# Patient Record
Sex: FEMALE | Race: WHITE | Employment: UNEMPLOYED | URBAN - METROPOLITAN AREA
[De-identification: names, ages, dates, MRNs, and addresses within clinical notes are randomized per-mention and may not be internally consistent; named-entity substitution may affect disease eponyms.]

---

## 2018-03-09 ENCOUNTER — ANESTHESIA EVENT (OUTPATIENT)
Dept: SURGERY | Age: 48
End: 2018-03-09
Payer: COMMERCIAL

## 2018-03-09 RX ORDER — LIDOCAINE HYDROCHLORIDE 10 MG/ML
0.1 INJECTION, SOLUTION EPIDURAL; INFILTRATION; INTRACAUDAL; PERINEURAL AS NEEDED
Status: CANCELLED | OUTPATIENT
Start: 2018-03-09

## 2018-03-09 RX ORDER — SODIUM CHLORIDE, SODIUM LACTATE, POTASSIUM CHLORIDE, CALCIUM CHLORIDE 600; 310; 30; 20 MG/100ML; MG/100ML; MG/100ML; MG/100ML
75 INJECTION, SOLUTION INTRAVENOUS CONTINUOUS
Status: CANCELLED | OUTPATIENT
Start: 2018-03-09 | End: 2018-03-10

## 2018-03-09 RX ORDER — FAMOTIDINE 20 MG/1
20 TABLET, FILM COATED ORAL ONCE
Status: CANCELLED | OUTPATIENT
Start: 2018-03-09 | End: 2018-03-09

## 2018-03-09 RX ORDER — CIPROFLOXACIN 2 MG/ML
400 INJECTION, SOLUTION INTRAVENOUS ONCE
Status: CANCELLED | OUTPATIENT
Start: 2018-03-09 | End: 2018-03-09

## 2018-03-12 ENCOUNTER — HOSPITAL ENCOUNTER (OUTPATIENT)
Age: 48
Setting detail: OBSERVATION
Discharge: HOME | End: 2018-03-14
Attending: UROLOGY | Admitting: UROLOGY
Payer: COMMERCIAL

## 2018-03-12 ENCOUNTER — APPOINTMENT (OUTPATIENT)
Dept: GENERAL RADIOLOGY | Age: 48
End: 2018-03-12
Attending: UROLOGY
Payer: COMMERCIAL

## 2018-03-12 ENCOUNTER — ANESTHESIA (OUTPATIENT)
Dept: SURGERY | Age: 48
End: 2018-03-12
Payer: COMMERCIAL

## 2018-03-12 ENCOUNTER — HOSPITAL ENCOUNTER (OUTPATIENT)
Dept: CARDIAC CATH/INVASIVE PROCEDURES | Age: 48
Discharge: HOME | End: 2018-03-12
Attending: RADIOLOGY | Admitting: RADIOLOGY
Payer: COMMERCIAL

## 2018-03-12 VITALS
RESPIRATION RATE: 16 BRPM | HEART RATE: 82 BPM | DIASTOLIC BLOOD PRESSURE: 72 MMHG | OXYGEN SATURATION: 98 % | SYSTOLIC BLOOD PRESSURE: 120 MMHG

## 2018-03-12 DIAGNOSIS — N20.0 KIDNEY STONE: ICD-10-CM

## 2018-03-12 LAB
ANION GAP SERPL CALC-SCNC: 7 MMOL/L (ref 3–18)
ANION GAP SERPL CALC-SCNC: 7 MMOL/L (ref 3–18)
APPEARANCE UR: ABNORMAL
APTT PPP: 26.4 SEC (ref 23–36.4)
BACTERIA URNS QL MICRO: ABNORMAL /HPF
BILIRUB UR QL: NEGATIVE
BUN SERPL-MCNC: 13 MG/DL (ref 7–18)
BUN SERPL-MCNC: 14 MG/DL (ref 7–18)
BUN/CREAT SERPL: 15 (ref 12–20)
BUN/CREAT SERPL: 17 (ref 12–20)
CALCIUM SERPL-MCNC: 7.7 MG/DL (ref 8.5–10.1)
CALCIUM SERPL-MCNC: 9.1 MG/DL (ref 8.5–10.1)
CHLORIDE SERPL-SCNC: 105 MMOL/L (ref 100–108)
CHLORIDE SERPL-SCNC: 107 MMOL/L (ref 100–108)
CO2 SERPL-SCNC: 26 MMOL/L (ref 21–32)
CO2 SERPL-SCNC: 27 MMOL/L (ref 21–32)
COLOR UR: YELLOW
CREAT SERPL-MCNC: 0.81 MG/DL (ref 0.6–1.3)
CREAT SERPL-MCNC: 0.84 MG/DL (ref 0.6–1.3)
EPITH CASTS URNS QL MICRO: ABNORMAL /LPF (ref 0–5)
ERYTHROCYTE [DISTWIDTH] IN BLOOD BY AUTOMATED COUNT: 15 % (ref 11.6–14.5)
GLUCOSE SERPL-MCNC: 103 MG/DL (ref 74–99)
GLUCOSE SERPL-MCNC: 126 MG/DL (ref 74–99)
GLUCOSE UR STRIP.AUTO-MCNC: NEGATIVE MG/DL
HCG UR QL: NEGATIVE
HCT VFR BLD AUTO: 35.2 % (ref 35–45)
HCT VFR BLD AUTO: 39.5 % (ref 35–45)
HGB BLD-MCNC: 13.8 G/DL (ref 12–16)
HGB UR QL STRIP: ABNORMAL
INR PPP: 0.9 (ref 0.8–1.2)
KETONES UR QL STRIP.AUTO: NEGATIVE MG/DL
LEUKOCYTE ESTERASE UR QL STRIP.AUTO: ABNORMAL
MCH RBC QN AUTO: 29.3 PG (ref 24–34)
MCHC RBC AUTO-ENTMCNC: 34.9 G/DL (ref 31–37)
MCV RBC AUTO: 83.9 FL (ref 74–97)
NITRITE UR QL STRIP.AUTO: NEGATIVE
PH UR STRIP: 8 [PH] (ref 5–8)
PLATELET # BLD AUTO: 405 K/UL (ref 135–420)
PMV BLD AUTO: 10 FL (ref 9.2–11.8)
POTASSIUM SERPL-SCNC: 3.6 MMOL/L (ref 3.5–5.5)
POTASSIUM SERPL-SCNC: 3.6 MMOL/L (ref 3.5–5.5)
PROT UR STRIP-MCNC: NEGATIVE MG/DL
PROTHROMBIN TIME: 11.9 SEC (ref 11.5–15.2)
RBC # BLD AUTO: 4.71 M/UL (ref 4.2–5.3)
RBC #/AREA URNS HPF: ABNORMAL /HPF (ref 0–5)
SODIUM SERPL-SCNC: 139 MMOL/L (ref 136–145)
SODIUM SERPL-SCNC: 140 MMOL/L (ref 136–145)
SP GR UR REFRACTOMETRY: 1.02 (ref 1–1.03)
UROBILINOGEN UR QL STRIP.AUTO: 0.2 EU/DL (ref 0.2–1)
WBC # BLD AUTO: 12.2 K/UL (ref 4.6–13.2)
WBC URNS QL MICRO: ABNORMAL /HPF (ref 0–4)

## 2018-03-12 PROCEDURE — 85730 THROMBOPLASTIN TIME PARTIAL: CPT | Performed by: RADIOLOGY

## 2018-03-12 PROCEDURE — 36415 COLL VENOUS BLD VENIPUNCTURE: CPT | Performed by: UROLOGY

## 2018-03-12 PROCEDURE — 85027 COMPLETE CBC AUTOMATED: CPT | Performed by: RADIOLOGY

## 2018-03-12 PROCEDURE — 99218 HC RM OBSERVATION: CPT

## 2018-03-12 PROCEDURE — 76010000171 HC OR TIME 2 TO 2.5 HR INTENSV-TIER 1: Performed by: UROLOGY

## 2018-03-12 PROCEDURE — 74011250636 HC RX REV CODE- 250/636

## 2018-03-12 PROCEDURE — 74425 UROGRAPHY ANTEGRADE RS&I: CPT

## 2018-03-12 PROCEDURE — 74011250637 HC RX REV CODE- 250/637: Performed by: NURSE ANESTHETIST, CERTIFIED REGISTERED

## 2018-03-12 PROCEDURE — 77030032490 HC SLV COMPR SCD KNE COVD -B: Performed by: UROLOGY

## 2018-03-12 PROCEDURE — 85610 PROTHROMBIN TIME: CPT | Performed by: RADIOLOGY

## 2018-03-12 PROCEDURE — 74011636320 HC RX REV CODE- 636/320: Performed by: UROLOGY

## 2018-03-12 PROCEDURE — 77030003403 MISC ANGIO PROCEDURE

## 2018-03-12 PROCEDURE — 77030008683 HC TU ET CUF COVD -A: Performed by: ANESTHESIOLOGY

## 2018-03-12 PROCEDURE — 77030011222 HC DEV INFL PTCA BSC -B: Performed by: UROLOGY

## 2018-03-12 PROCEDURE — 76060000035 HC ANESTHESIA 2 TO 2.5 HR: Performed by: UROLOGY

## 2018-03-12 PROCEDURE — C1769 GUIDE WIRE: HCPCS | Performed by: UROLOGY

## 2018-03-12 PROCEDURE — 87086 URINE CULTURE/COLONY COUNT: CPT | Performed by: UROLOGY

## 2018-03-12 PROCEDURE — 77030005518 HC CATH URETH FOL 2W BARD -B: Performed by: UROLOGY

## 2018-03-12 PROCEDURE — 81001 URINALYSIS AUTO W/SCOPE: CPT | Performed by: UROLOGY

## 2018-03-12 PROCEDURE — 80048 BASIC METABOLIC PNL TOTAL CA: CPT | Performed by: RADIOLOGY

## 2018-03-12 PROCEDURE — 74011250636 HC RX REV CODE- 250/636: Performed by: RADIOLOGY

## 2018-03-12 PROCEDURE — 82360 CALCULUS ASSAY QUANT: CPT | Performed by: UROLOGY

## 2018-03-12 PROCEDURE — 77030013079 HC BLNKT BAIR HGGR 3M -A: Performed by: ANESTHESIOLOGY

## 2018-03-12 PROCEDURE — 81025 URINE PREGNANCY TEST: CPT

## 2018-03-12 PROCEDURE — 74011250637 HC RX REV CODE- 250/637: Performed by: UROLOGY

## 2018-03-12 PROCEDURE — 77030018836 HC SOL IRR NACL ICUM -A: Performed by: UROLOGY

## 2018-03-12 PROCEDURE — 77030010545: Performed by: UROLOGY

## 2018-03-12 PROCEDURE — 80048 BASIC METABOLIC PNL TOTAL CA: CPT | Performed by: UROLOGY

## 2018-03-12 PROCEDURE — 74011636320 HC RX REV CODE- 636/320: Performed by: RADIOLOGY

## 2018-03-12 PROCEDURE — 77010033678 HC OXYGEN DAILY

## 2018-03-12 PROCEDURE — 50390 DRAINAGE OF KIDNEY LESION: CPT

## 2018-03-12 PROCEDURE — 85014 HEMATOCRIT: CPT | Performed by: UROLOGY

## 2018-03-12 PROCEDURE — C1758 CATHETER, URETERAL: HCPCS | Performed by: UROLOGY

## 2018-03-12 PROCEDURE — 76210000016 HC OR PH I REC 1 TO 1.5 HR: Performed by: UROLOGY

## 2018-03-12 PROCEDURE — 74011250636 HC RX REV CODE- 250/636: Performed by: NURSE ANESTHETIST, CERTIFIED REGISTERED

## 2018-03-12 PROCEDURE — 50432 PLMT NEPHROSTOMY CATHETER: CPT

## 2018-03-12 PROCEDURE — 74011000250 HC RX REV CODE- 250: Performed by: RADIOLOGY

## 2018-03-12 PROCEDURE — 74011250636 HC RX REV CODE- 250/636: Performed by: UROLOGY

## 2018-03-12 PROCEDURE — 74011000250 HC RX REV CODE- 250

## 2018-03-12 RX ORDER — CIPROFLOXACIN 2 MG/ML
400 INJECTION, SOLUTION INTRAVENOUS EVERY 12 HOURS
Status: COMPLETED | OUTPATIENT
Start: 2018-03-12 | End: 2018-03-13

## 2018-03-12 RX ORDER — GENTAMICIN SULFATE 40 MG/ML
INJECTION, SOLUTION INTRAMUSCULAR; INTRAVENOUS AS NEEDED
Status: DISCONTINUED | OUTPATIENT
Start: 2018-03-12 | End: 2018-03-12

## 2018-03-12 RX ORDER — FAMOTIDINE 20 MG/1
20 TABLET, FILM COATED ORAL ONCE
Status: COMPLETED | OUTPATIENT
Start: 2018-03-12 | End: 2018-03-12

## 2018-03-12 RX ORDER — FENTANYL CITRATE 50 UG/ML
25-100 INJECTION, SOLUTION INTRAMUSCULAR; INTRAVENOUS
Status: DISCONTINUED | OUTPATIENT
Start: 2018-03-12 | End: 2018-03-12

## 2018-03-12 RX ORDER — LIDOCAINE HYDROCHLORIDE 20 MG/ML
INJECTION, SOLUTION EPIDURAL; INFILTRATION; INTRACAUDAL; PERINEURAL AS NEEDED
Status: DISCONTINUED | OUTPATIENT
Start: 2018-03-12 | End: 2018-03-12 | Stop reason: HOSPADM

## 2018-03-12 RX ORDER — PAROXETINE HYDROCHLORIDE 20 MG/1
40 TABLET, FILM COATED ORAL DAILY
Status: DISCONTINUED | OUTPATIENT
Start: 2018-03-13 | End: 2018-03-14 | Stop reason: HOSPADM

## 2018-03-12 RX ORDER — SODIUM CHLORIDE 9 MG/ML
75 INJECTION, SOLUTION INTRAVENOUS CONTINUOUS
Status: DISCONTINUED | OUTPATIENT
Start: 2018-03-12 | End: 2018-03-14 | Stop reason: HOSPADM

## 2018-03-12 RX ORDER — GLYCOPYRROLATE 0.2 MG/ML
INJECTION INTRAMUSCULAR; INTRAVENOUS AS NEEDED
Status: DISCONTINUED | OUTPATIENT
Start: 2018-03-12 | End: 2018-03-12 | Stop reason: HOSPADM

## 2018-03-12 RX ORDER — CIPROFLOXACIN 2 MG/ML
400 INJECTION, SOLUTION INTRAVENOUS ONCE
Status: COMPLETED | OUTPATIENT
Start: 2018-03-12 | End: 2018-03-12

## 2018-03-12 RX ORDER — FENTANYL CITRATE 50 UG/ML
50 INJECTION, SOLUTION INTRAMUSCULAR; INTRAVENOUS
Status: DISCONTINUED | OUTPATIENT
Start: 2018-03-12 | End: 2018-03-12

## 2018-03-12 RX ORDER — FERROUS SULFATE, DRIED 160(50) MG
1 TABLET, EXTENDED RELEASE ORAL DAILY
Status: DISCONTINUED | OUTPATIENT
Start: 2018-03-13 | End: 2018-03-14 | Stop reason: HOSPADM

## 2018-03-12 RX ORDER — SODIUM CHLORIDE, SODIUM LACTATE, POTASSIUM CHLORIDE, CALCIUM CHLORIDE 600; 310; 30; 20 MG/100ML; MG/100ML; MG/100ML; MG/100ML
25 INJECTION, SOLUTION INTRAVENOUS CONTINUOUS
Status: DISCONTINUED | OUTPATIENT
Start: 2018-03-12 | End: 2018-03-12

## 2018-03-12 RX ORDER — DOCUSATE SODIUM 100 MG/1
100 CAPSULE, LIQUID FILLED ORAL 2 TIMES DAILY
Status: DISCONTINUED | OUTPATIENT
Start: 2018-03-12 | End: 2018-03-14 | Stop reason: HOSPADM

## 2018-03-12 RX ORDER — FENTANYL CITRATE 50 UG/ML
INJECTION, SOLUTION INTRAMUSCULAR; INTRAVENOUS AS NEEDED
Status: DISCONTINUED | OUTPATIENT
Start: 2018-03-12 | End: 2018-03-12 | Stop reason: HOSPADM

## 2018-03-12 RX ORDER — FAMOTIDINE 20 MG/1
TABLET, FILM COATED ORAL
Status: DISCONTINUED
Start: 2018-03-12 | End: 2018-03-12

## 2018-03-12 RX ORDER — ONDANSETRON 2 MG/ML
INJECTION INTRAMUSCULAR; INTRAVENOUS
Status: COMPLETED
Start: 2018-03-12 | End: 2018-03-12

## 2018-03-12 RX ORDER — MIDAZOLAM HYDROCHLORIDE 1 MG/ML
INJECTION, SOLUTION INTRAMUSCULAR; INTRAVENOUS AS NEEDED
Status: DISCONTINUED | OUTPATIENT
Start: 2018-03-12 | End: 2018-03-12 | Stop reason: HOSPADM

## 2018-03-12 RX ORDER — ALBUTEROL SULFATE 0.83 MG/ML
2.5 SOLUTION RESPIRATORY (INHALATION)
Status: DISCONTINUED | OUTPATIENT
Start: 2018-03-12 | End: 2018-03-14 | Stop reason: HOSPADM

## 2018-03-12 RX ORDER — DEXTROSE 50 % IN WATER (D50W) INTRAVENOUS SYRINGE
25-50 AS NEEDED
Status: DISCONTINUED | OUTPATIENT
Start: 2018-03-12 | End: 2018-03-12

## 2018-03-12 RX ORDER — LIDOCAINE HYDROCHLORIDE 10 MG/ML
0.1 INJECTION INFILTRATION; PERINEURAL AS NEEDED
Status: DISCONTINUED | OUTPATIENT
Start: 2018-03-12 | End: 2018-03-16 | Stop reason: HOSPADM

## 2018-03-12 RX ORDER — SODIUM CHLORIDE, SODIUM LACTATE, POTASSIUM CHLORIDE, CALCIUM CHLORIDE 600; 310; 30; 20 MG/100ML; MG/100ML; MG/100ML; MG/100ML
75 INJECTION, SOLUTION INTRAVENOUS CONTINUOUS
Status: DISPENSED | OUTPATIENT
Start: 2018-03-12 | End: 2018-03-13

## 2018-03-12 RX ORDER — HYDROMORPHONE HYDROCHLORIDE 2 MG/1
2 TABLET ORAL
Status: DISCONTINUED | OUTPATIENT
Start: 2018-03-12 | End: 2018-03-14 | Stop reason: HOSPADM

## 2018-03-12 RX ORDER — MIDAZOLAM HYDROCHLORIDE 1 MG/ML
1-2 INJECTION, SOLUTION INTRAMUSCULAR; INTRAVENOUS
Status: DISCONTINUED | OUTPATIENT
Start: 2018-03-12 | End: 2018-03-12 | Stop reason: HOSPADM

## 2018-03-12 RX ORDER — ACETAMINOPHEN 325 MG/1
650 TABLET ORAL
Status: DISCONTINUED | OUTPATIENT
Start: 2018-03-12 | End: 2018-03-14 | Stop reason: HOSPADM

## 2018-03-12 RX ORDER — POTASSIUM CHLORIDE 750 MG/1
10 TABLET, FILM COATED, EXTENDED RELEASE ORAL 2 TIMES DAILY
Status: DISCONTINUED | OUTPATIENT
Start: 2018-03-12 | End: 2018-03-14 | Stop reason: HOSPADM

## 2018-03-12 RX ORDER — LIDOCAINE HYDROCHLORIDE 10 MG/ML
1-60 INJECTION INFILTRATION; PERINEURAL
Status: DISCONTINUED | OUTPATIENT
Start: 2018-03-12 | End: 2018-03-12

## 2018-03-12 RX ORDER — VECURONIUM BROMIDE FOR INJECTION 1 MG/ML
INJECTION, POWDER, LYOPHILIZED, FOR SOLUTION INTRAVENOUS AS NEEDED
Status: DISCONTINUED | OUTPATIENT
Start: 2018-03-12 | End: 2018-03-12 | Stop reason: HOSPADM

## 2018-03-12 RX ORDER — MIDAZOLAM HYDROCHLORIDE 1 MG/ML
INJECTION, SOLUTION INTRAMUSCULAR; INTRAVENOUS
Status: COMPLETED
Start: 2018-03-12 | End: 2018-03-12

## 2018-03-12 RX ORDER — TRAZODONE HYDROCHLORIDE 100 MG/1
100 TABLET ORAL
Status: DISCONTINUED | OUTPATIENT
Start: 2018-03-12 | End: 2018-03-14 | Stop reason: HOSPADM

## 2018-03-12 RX ORDER — ALBUTEROL SULFATE 90 UG/1
2 AEROSOL, METERED RESPIRATORY (INHALATION)
Status: DISCONTINUED | OUTPATIENT
Start: 2018-03-12 | End: 2018-03-12 | Stop reason: CLARIF

## 2018-03-12 RX ORDER — ZIPRASIDONE HYDROCHLORIDE 20 MG/1
60 CAPSULE ORAL 2 TIMES DAILY WITH MEALS
Status: DISCONTINUED | OUTPATIENT
Start: 2018-03-13 | End: 2018-03-14 | Stop reason: HOSPADM

## 2018-03-12 RX ORDER — HYDROMORPHONE HYDROCHLORIDE 2 MG/ML
1 INJECTION, SOLUTION INTRAMUSCULAR; INTRAVENOUS; SUBCUTANEOUS
Status: DISCONTINUED | OUTPATIENT
Start: 2018-03-12 | End: 2018-03-14 | Stop reason: HOSPADM

## 2018-03-12 RX ORDER — ONDANSETRON 2 MG/ML
4 INJECTION INTRAMUSCULAR; INTRAVENOUS
Status: DISCONTINUED | OUTPATIENT
Start: 2018-03-12 | End: 2018-03-14 | Stop reason: HOSPADM

## 2018-03-12 RX ORDER — LIDOCAINE HYDROCHLORIDE 10 MG/ML
1-60 INJECTION INFILTRATION; PERINEURAL
Status: DISCONTINUED | OUTPATIENT
Start: 2018-03-12 | End: 2018-03-12 | Stop reason: HOSPADM

## 2018-03-12 RX ORDER — MONTELUKAST SODIUM 10 MG/1
10 TABLET ORAL DAILY
Status: DISCONTINUED | OUTPATIENT
Start: 2018-03-13 | End: 2018-03-14 | Stop reason: HOSPADM

## 2018-03-12 RX ORDER — LEVOTHYROXINE AND LIOTHYRONINE 19; 4.5 UG/1; UG/1
60 TABLET ORAL DAILY
Status: DISCONTINUED | OUTPATIENT
Start: 2018-03-13 | End: 2018-03-14 | Stop reason: HOSPADM

## 2018-03-12 RX ORDER — SODIUM CHLORIDE 0.9 % (FLUSH) 0.9 %
5-10 SYRINGE (ML) INJECTION EVERY 8 HOURS
Status: DISCONTINUED | OUTPATIENT
Start: 2018-03-12 | End: 2018-03-14 | Stop reason: HOSPADM

## 2018-03-12 RX ORDER — FLUTICASONE PROPIONATE 50 MCG
2 SPRAY, SUSPENSION (ML) NASAL DAILY
Status: DISCONTINUED | OUTPATIENT
Start: 2018-03-13 | End: 2018-03-14 | Stop reason: HOSPADM

## 2018-03-12 RX ORDER — SODIUM CHLORIDE 0.9 % (FLUSH) 0.9 %
5-10 SYRINGE (ML) INJECTION AS NEEDED
Status: DISCONTINUED | OUTPATIENT
Start: 2018-03-12 | End: 2018-03-14 | Stop reason: HOSPADM

## 2018-03-12 RX ORDER — SUCCINYLCHOLINE CHLORIDE 20 MG/ML
INJECTION INTRAMUSCULAR; INTRAVENOUS AS NEEDED
Status: DISCONTINUED | OUTPATIENT
Start: 2018-03-12 | End: 2018-03-12 | Stop reason: HOSPADM

## 2018-03-12 RX ORDER — PROPOFOL 10 MG/ML
INJECTION, EMULSION INTRAVENOUS AS NEEDED
Status: DISCONTINUED | OUTPATIENT
Start: 2018-03-12 | End: 2018-03-12 | Stop reason: HOSPADM

## 2018-03-12 RX ORDER — HYDROCHLOROTHIAZIDE 12.5 MG/1
12.5 CAPSULE ORAL DAILY
Status: DISCONTINUED | OUTPATIENT
Start: 2018-03-13 | End: 2018-03-14 | Stop reason: HOSPADM

## 2018-03-12 RX ORDER — SPIRONOLACTONE 25 MG/1
25 TABLET ORAL DAILY
Status: DISCONTINUED | OUTPATIENT
Start: 2018-03-13 | End: 2018-03-14 | Stop reason: HOSPADM

## 2018-03-12 RX ORDER — NALOXONE HYDROCHLORIDE 0.4 MG/ML
0.4 INJECTION, SOLUTION INTRAMUSCULAR; INTRAVENOUS; SUBCUTANEOUS AS NEEDED
Status: DISCONTINUED | OUTPATIENT
Start: 2018-03-12 | End: 2018-03-14 | Stop reason: HOSPADM

## 2018-03-12 RX ORDER — CIPROFLOXACIN 2 MG/ML
400 INJECTION, SOLUTION INTRAVENOUS ONCE
Status: DISCONTINUED | OUTPATIENT
Start: 2018-03-12 | End: 2018-03-12

## 2018-03-12 RX ORDER — FENTANYL CITRATE 50 UG/ML
25 INJECTION, SOLUTION INTRAMUSCULAR; INTRAVENOUS AS NEEDED
Status: DISCONTINUED | OUTPATIENT
Start: 2018-03-12 | End: 2018-03-12

## 2018-03-12 RX ORDER — FENTANYL CITRATE 50 UG/ML
INJECTION, SOLUTION INTRAMUSCULAR; INTRAVENOUS
Status: COMPLETED
Start: 2018-03-12 | End: 2018-03-12

## 2018-03-12 RX ORDER — NEOSTIGMINE METHYLSULFATE 5 MG/5 ML
SYRINGE (ML) INTRAVENOUS AS NEEDED
Status: DISCONTINUED | OUTPATIENT
Start: 2018-03-12 | End: 2018-03-12 | Stop reason: HOSPADM

## 2018-03-12 RX ORDER — LANOLIN ALCOHOL/MO/W.PET/CERES
400 CREAM (GRAM) TOPICAL DAILY
Status: DISCONTINUED | OUTPATIENT
Start: 2018-03-13 | End: 2018-03-14 | Stop reason: HOSPADM

## 2018-03-12 RX ORDER — HEPARIN SODIUM 200 [USP'U]/100ML
500 INJECTION, SOLUTION INTRAVENOUS ONCE
Status: COMPLETED | OUTPATIENT
Start: 2018-03-12 | End: 2018-03-12

## 2018-03-12 RX ORDER — DEXAMETHASONE SODIUM PHOSPHATE 4 MG/ML
INJECTION, SOLUTION INTRA-ARTICULAR; INTRALESIONAL; INTRAMUSCULAR; INTRAVENOUS; SOFT TISSUE AS NEEDED
Status: DISCONTINUED | OUTPATIENT
Start: 2018-03-12 | End: 2018-03-12 | Stop reason: HOSPADM

## 2018-03-12 RX ORDER — ESTRADIOL 0.03 MG/D
1 PATCH TRANSDERMAL
Status: DISCONTINUED | OUTPATIENT
Start: 2018-03-14 | End: 2018-03-13

## 2018-03-12 RX ORDER — ONDANSETRON 2 MG/ML
INJECTION INTRAMUSCULAR; INTRAVENOUS AS NEEDED
Status: DISCONTINUED | OUTPATIENT
Start: 2018-03-12 | End: 2018-03-12 | Stop reason: HOSPADM

## 2018-03-12 RX ORDER — SODIUM CHLORIDE 9 MG/ML
20 INJECTION, SOLUTION INTRAVENOUS CONTINUOUS
Status: DISCONTINUED | OUTPATIENT
Start: 2018-03-12 | End: 2018-03-16 | Stop reason: HOSPADM

## 2018-03-12 RX ORDER — MAGNESIUM SULFATE 100 %
4 CRYSTALS MISCELLANEOUS AS NEEDED
Status: DISCONTINUED | OUTPATIENT
Start: 2018-03-12 | End: 2018-03-12

## 2018-03-12 RX ORDER — FENTANYL CITRATE 50 UG/ML
25-100 INJECTION, SOLUTION INTRAMUSCULAR; INTRAVENOUS
Status: DISCONTINUED | OUTPATIENT
Start: 2018-03-12 | End: 2018-03-12 | Stop reason: HOSPADM

## 2018-03-12 RX ORDER — OXYCODONE AND ACETAMINOPHEN 5; 325 MG/1; MG/1
1 TABLET ORAL AS NEEDED
Status: DISCONTINUED | OUTPATIENT
Start: 2018-03-12 | End: 2018-03-12

## 2018-03-12 RX ORDER — MIDAZOLAM HYDROCHLORIDE 1 MG/ML
1-2 INJECTION, SOLUTION INTRAMUSCULAR; INTRAVENOUS
Status: DISCONTINUED | OUTPATIENT
Start: 2018-03-12 | End: 2018-03-12

## 2018-03-12 RX ADMIN — CIPROFLOXACIN 400 MG: 2 INJECTION, SOLUTION INTRAVENOUS at 23:21

## 2018-03-12 RX ADMIN — FAMOTIDINE 20 MG: 20 TABLET, FILM COATED ORAL at 10:46

## 2018-03-12 RX ADMIN — LIDOCAINE HYDROCHLORIDE 10 ML: 10 INJECTION, SOLUTION INFILTRATION; PERINEURAL at 11:40

## 2018-03-12 RX ADMIN — MIDAZOLAM HYDROCHLORIDE 2 MG: 1 INJECTION, SOLUTION INTRAMUSCULAR; INTRAVENOUS at 13:27

## 2018-03-12 RX ADMIN — DOCUSATE SODIUM 100 MG: 100 CAPSULE, LIQUID FILLED ORAL at 18:46

## 2018-03-12 RX ADMIN — CIPROFLOXACIN 400 MG: 2 INJECTION, SOLUTION INTRAVENOUS at 11:29

## 2018-03-12 RX ADMIN — PROPOFOL 200 MG: 10 INJECTION, EMULSION INTRAVENOUS at 13:41

## 2018-03-12 RX ADMIN — Medication 10 ML: at 23:22

## 2018-03-12 RX ADMIN — ONDANSETRON 4 MG: 2 INJECTION INTRAMUSCULAR; INTRAVENOUS at 15:13

## 2018-03-12 RX ADMIN — FENTANYL CITRATE 50 MCG: 50 INJECTION, SOLUTION INTRAMUSCULAR; INTRAVENOUS at 14:19

## 2018-03-12 RX ADMIN — SUCCINYLCHOLINE CHLORIDE 120 MG: 20 INJECTION INTRAMUSCULAR; INTRAVENOUS at 13:41

## 2018-03-12 RX ADMIN — VECURONIUM BROMIDE FOR INJECTION 5 MG: 1 INJECTION, POWDER, LYOPHILIZED, FOR SOLUTION INTRAVENOUS at 13:50

## 2018-03-12 RX ADMIN — MIDAZOLAM HYDROCHLORIDE 1 MG: 1 INJECTION, SOLUTION INTRAMUSCULAR; INTRAVENOUS at 11:46

## 2018-03-12 RX ADMIN — FENTANYL CITRATE 50 MCG: 50 INJECTION, SOLUTION INTRAMUSCULAR; INTRAVENOUS at 12:05

## 2018-03-12 RX ADMIN — GLYCOPYRROLATE 0.4 MG: 0.2 INJECTION INTRAMUSCULAR; INTRAVENOUS at 15:24

## 2018-03-12 RX ADMIN — FENTANYL CITRATE 50 MCG: 50 INJECTION, SOLUTION INTRAMUSCULAR; INTRAVENOUS at 11:46

## 2018-03-12 RX ADMIN — DEXAMETHASONE SODIUM PHOSPHATE 8 MG: 4 INJECTION, SOLUTION INTRA-ARTICULAR; INTRALESIONAL; INTRAMUSCULAR; INTRAVENOUS; SOFT TISSUE at 15:12

## 2018-03-12 RX ADMIN — SODIUM CHLORIDE 100 ML/HR: 900 INJECTION, SOLUTION INTRAVENOUS at 17:13

## 2018-03-12 RX ADMIN — HYDROMORPHONE HYDROCHLORIDE 2 MG: 2 TABLET ORAL at 18:46

## 2018-03-12 RX ADMIN — IOPAMIDOL 4 ML: 612 INJECTION, SOLUTION INTRAVENOUS at 12:44

## 2018-03-12 RX ADMIN — HEPARIN SODIUM IN SODIUM CHLORIDE 1000 UNITS: 200 INJECTION INTRAVENOUS at 11:41

## 2018-03-12 RX ADMIN — SODIUM CHLORIDE 20 ML/HR: 900 INJECTION, SOLUTION INTRAVENOUS at 10:46

## 2018-03-12 RX ADMIN — FENTANYL CITRATE 50 MCG: 50 INJECTION, SOLUTION INTRAMUSCULAR; INTRAVENOUS at 11:49

## 2018-03-12 RX ADMIN — MIDAZOLAM HYDROCHLORIDE 1 MG: 1 INJECTION, SOLUTION INTRAMUSCULAR; INTRAVENOUS at 11:49

## 2018-03-12 RX ADMIN — MIDAZOLAM HYDROCHLORIDE 1 MG: 1 INJECTION, SOLUTION INTRAMUSCULAR; INTRAVENOUS at 12:05

## 2018-03-12 RX ADMIN — SODIUM CHLORIDE, SODIUM LACTATE, POTASSIUM CHLORIDE, AND CALCIUM CHLORIDE: 600; 310; 30; 20 INJECTION, SOLUTION INTRAVENOUS at 13:31

## 2018-03-12 RX ADMIN — HYDROMORPHONE HYDROCHLORIDE 1 MG: 2 INJECTION INTRAMUSCULAR; INTRAVENOUS; SUBCUTANEOUS at 20:25

## 2018-03-12 RX ADMIN — POTASSIUM CHLORIDE 10 MEQ: 750 TABLET, EXTENDED RELEASE ORAL at 18:46

## 2018-03-12 RX ADMIN — FENTANYL CITRATE 50 MCG: 50 INJECTION, SOLUTION INTRAMUSCULAR; INTRAVENOUS at 13:41

## 2018-03-12 RX ADMIN — Medication 3 MG: at 15:24

## 2018-03-12 RX ADMIN — LIDOCAINE HYDROCHLORIDE 40 MG: 20 INJECTION, SOLUTION EPIDURAL; INFILTRATION; INTRACAUDAL; PERINEURAL at 13:41

## 2018-03-12 RX ADMIN — FENTANYL CITRATE 50 MCG: 50 INJECTION, SOLUTION INTRAMUSCULAR; INTRAVENOUS at 12:32

## 2018-03-12 RX ADMIN — ONDANSETRON 4 MG: 2 INJECTION INTRAMUSCULAR; INTRAVENOUS at 16:30

## 2018-03-12 RX ADMIN — Medication 10 ML: at 18:47

## 2018-03-12 RX ADMIN — TRAZODONE HYDROCHLORIDE 100 MG: 100 TABLET ORAL at 23:20

## 2018-03-12 RX ADMIN — MIDAZOLAM HYDROCHLORIDE 1 MG: 1 INJECTION, SOLUTION INTRAMUSCULAR; INTRAVENOUS at 12:32

## 2018-03-12 NOTE — ANESTHESIA PREPROCEDURE EVALUATION
Anesthetic History   No history of anesthetic complications            Review of Systems / Medical History  Patient summary reviewed, nursing notes reviewed and pertinent labs reviewed    Pulmonary        Sleep apnea: CPAP    Asthma        Neuro/Psych   Within defined limits           Cardiovascular  Within defined limits                Exercise tolerance: >4 METS     GI/Hepatic/Renal  Within defined limits              Endo/Other      Hypothyroidism: well controlled       Other Findings   Comments: Documentation of current medication  Current medications obtained, documented and obtained? YES      Risk Factors for Postoperative nausea/vomiting:       History of postoperative nausea/vomiting? NO       Female? YES       Motion sickness? NO       Intended opioid administration for postoperative analgesia? YES      Smoking Abstinence:  Current Smoker? NO  Elective Surgery? YES  Seen preoperatively by anesthesiologist or proxy prior to day of surgery? YES  Pt abstained from smoking 24 hours prior to anesthesia? N/A    Preventive care/screening for High Blood Pressure:  Aged 18 years and older: YES  Screened for high blood pressure: YES  Patients with high blood pressure referred to primary care provider   for BP management: YES                 Physical Exam    Airway  Mallampati: II  TM Distance: 4 - 6 cm  Neck ROM: normal range of motion   Mouth opening: Normal     Cardiovascular  Regular rate and rhythm,  S1 and S2 normal,  no murmur, click, rub, or gallop  Rhythm: regular  Rate: normal         Dental  No notable dental hx       Pulmonary  Breath sounds clear to auscultation               Abdominal  GI exam deferred       Other Findings            Anesthetic Plan    ASA: 2  Anesthesia type: general          Induction: Intravenous  Anesthetic plan and risks discussed with: Patient      Pt was asked to use her albuterol MDI preop 2 puffs as a preventative measure.

## 2018-03-12 NOTE — IP AVS SNAPSHOT
[address] Patient: Alissa Roberto MRN: [id number] [facility]:[id number] About your hospitalization You were admitted on:  March 12, 2018 You last received care in the:  [address] [place] [address],[address] You were discharged on:  March 14, 2018 Why you were hospitalized Your primary diagnosis was:  Not on File Your diagnoses also included:  Nephrolithiasis, Staghorn Renal Calculus Follow-up Information Follow up With Details Comments Contact Info MD Fabienne Andrade 19 [facility] [address] [facility] [address] 
[phone] Jenn Askew MD In 1 week  301 [facility] [address] [place] [address] 
[phone] Discharge Orders None A check christopher indicates which time of day the medication should be taken. My Medications START taking these medications Instructions Each Dose to Equal  
 Morning Noon Evening Bedtime * ciprofloxacin HCl 500 mg tablet Commonly known as:  CIPRO Your last dose was: Your next dose is: Take 1 Tab by mouth two (2) times a day for 4 days. Indications: PROTEUS URINARY TRACT INFECTION  
 500 mg  
    
   
   
   
  
 docusate sodium 100 mg capsule Commonly known as:  Arlin Dewey Your last dose was: Your next dose is: Take 1 Cap by mouth two (2) times a day for 30 days. 100 mg HYDROmorphone 2 mg tablet Commonly known as:  DILAUDID Your last dose was: Your next dose is: Take 1 Tab by mouth every four (4) hours as needed for Pain. Max Daily Amount: 12 mg.  
 2 mg * Notice: This list has 1 medication(s) that are the same as other medications prescribed for you. Read the directions carefully, and ask your doctor or other care provider to review them with you. CONTINUE taking these medications Instructions Each Dose to Equal  
 Morning Noon Evening Bedtime  
 albuterol 90 mcg/actuation inhaler Commonly known as:  PROVENTIL HFA, VENTOLIN HFA, PROAIR HFA Your last dose was: Your next dose is: Take  by inhalation. ARMOUR THYROID 60 mg tablet Generic drug:  thyroid (Pork) Your last dose was: Your next dose is: Take 60 mg by mouth daily. 60 mg  
    
   
   
   
  
 calcium-cholecalciferol (d3) 600-125 mg-unit Tab Your last dose was: Your next dose is: Take  by mouth.  
     
   
   
   
  
 estradiol 0.025 mg/24 hr Ptsw Your last dose was: Your next dose is:    
   
   
 by TransDERmal route. FLONASE 50 mcg/actuation nasal spray Generic drug:  fluticasone Your last dose was: Your next dose is: 2 Sprays by Both Nostrils route daily. 2 Spray  
    
   
   
   
  
 hydroCHLOROthiazide 12.5 mg capsule Commonly known as:  Lily Jeff Your last dose was: Your next dose is: Take 12.5 mg by mouth daily. 12.5 mg  
    
   
   
   
  
 magnesium oxide 400 mg tablet Commonly known as:  MAG-OX Your last dose was: Your next dose is: Take 400 mg by mouth daily. 400 mg  
    
   
   
   
  
 montelukast 10 mg tablet Commonly known as:  SINGULAIR Your last dose was: Your next dose is: Take 10 mg by mouth daily. 10 mg PARoxetine 40 mg tablet Commonly known as:  PAXIL Your last dose was: Your next dose is: Take 40 mg by mouth daily. 40 mg  
    
   
   
   
  
 potassium chloride 20 mEq tablet Commonly known as:  K-DUR, KLOR-CON Your last dose was: Your next dose is: Take  by mouth two (2) times a day. spironolactone 25 mg tablet Commonly known as:  ALDACTONE Your last dose was: Your next dose is: Take  by mouth daily. traZODone 100 mg tablet Commonly known as:  Larena Legato Your last dose was: Your next dose is: Take 100 mg by mouth nightly. 100 mg  
    
   
   
   
  
 VYVANSE 70 mg capsule Generic drug:  Lisdexamfetamine Your last dose was: Your next dose is: Take 70 mg by mouth every morning. 70 mg  
    
   
   
   
  
 ziprasidone hcl 60 mg capsule Commonly known as:  Shai Canavan Your last dose was: Your next dose is: Take 60 mg by mouth two (2) times daily (with meals). 60 mg ASK your doctor about these medications Instructions Each Dose to Equal  
 Morning Noon Evening Bedtime * ciprofloxacin HCl 500 mg tablet Commonly known as:  CIPRO Ask about: Should I take this medication? Your last dose was: Your next dose is: Take 1 Tab by mouth two (2) times a day for 10 days. 500 mg * Notice: This list has 1 medication(s) that are the same as other medications prescribed for you. Read the directions carefully, and ask your doctor or other care provider to review them with you. Where to Get Your Medications Information on where to get these meds will be given to you by the nurse or doctor. ! Ask your nurse or doctor about these medications  
  ciprofloxacin HCl 500 mg tablet  
 docusate sodium 100 mg capsule HYDROmorphone 2 mg tablet Discharge Instructions Kidney Stone: Care Instructions Your Care Instructions Kidney stones are formed when salts, minerals, and other substances normally found in the urine clump together. They can be as small as grains of sand or, rarely, as large as golf balls.  
While the stone is traveling through the ureter, which is the tube that carries urine from the kidney to the bladder, you will probably feel pain. The pain may be mild or very severe. You may also have some blood in your urine. As soon as the stone reaches the bladder, any intense pain should go away. If a stone is too large to pass on its own, you may need a medical procedure to help you pass the stone. The doctor has checked you carefully, but problems can develop later. If you notice any problems or new symptoms, get medical treatment right away. Follow-up care is a key part of your treatment and safety. Be sure to make and go to all appointments, and call your doctor if you are having problems. It's also a good idea to know your test results and keep a list of the medicines you take. How can you care for yourself at home? · Drink plenty of fluids, enough so that your urine is light yellow or clear like water. If you have kidney, heart, or liver disease and have to limit fluids, talk with your doctor before you increase the amount of fluids you drink. · Take pain medicines exactly as directed. Call your doctor if you think you are having a problem with your medicine. ¨ If the doctor gave you a prescription medicine for pain, take it as prescribed. ¨ If you are not taking a prescription pain medicine, ask your doctor if you can take an over-the-counter medicine. Read and follow all instructions on the label. · Your doctor may ask you to strain your urine so that you can collect your kidney stone when it passes. You can use a kitchen strainer or a tea strainer to catch the stone. Store it in a plastic bag until you see your doctor again. Preventing future kidney stones Some changes in your diet may help prevent kidney stones. Depending on the cause of your stones, your doctor may recommend that you: · Drink plenty of fluids, enough so that your urine is light yellow or clear like water.  If you have kidney, heart, or liver disease and have to limit fluids, talk with your doctor before you increase the amount of fluids you drink. · Limit coffee, tea, and alcohol. Also avoid grapefruit juice. · Do not take more than the recommended daily dose of vitamins C and D. 
· Avoid antacids such as Gaviscon, Maalox, Mylanta, or Tums. · Limit the amount of salt (sodium) in your diet. · Eat a balanced diet that is not too high in protein. · Limit foods that are high in a substance called oxalate, which can cause kidney stones. These foods include dark green vegetables, rhubarb, chocolate, wheat bran, nuts, cranberries, and beans. When should you call for help? Call your doctor now or seek immediate medical care if: 
? · You cannot keep down fluids. ? · Your pain gets worse. ? · You have a fever or chills. ? · You have new or worse pain in your back just below your rib cage (the flank area). ? · You have new or more blood in your urine. ? Watch closely for changes in your health, and be sure to contact your doctor if: 
? · You do not get better as expected. Where can you learn more? Go to http://blessing-luiz.info/. Enter I884 in the search box to learn more about \"Kidney Stone: Care Instructions. \" Current as of: May 12, 2017 Content Version: 11.4 © 3029-3460 [employer]. Care instructions adapted under license by [employer] (which disclaims liability or warranty for this information). If you have questions about a medical condition or this instruction, always ask your healthcare professional. [place] [address] any warranty or liability for your use of this information. DISCHARGE SUMMARY from Nurse PATIENT INSTRUCTIONS: 
 
 
F-face looks uneven A-arms unable to move or move unevenly S-speech slurred or non-existent T-time-call 911 as soon as signs and symptoms begin-DO NOT go Back to bed or wait to see if you get better-TIME IS BRAIN. Warning Signs of HEART ATTACK Call 911 if you have these symptoms: 
? Chest discomfort. Most heart attacks involve discomfort in the center of the chest that lasts more than a few minutes, or that goes away and comes back. It can feel like uncomfortable pressure, squeezing, fullness, or pain. ? Discomfort in other areas of the upper body. Symptoms can include pain or discomfort in one or both arms, the back, neck, jaw, or stomach. ? Shortness of breath with or without chest discomfort. ? Other signs may include breaking out in a cold sweat, nausea, or lightheadedness. Don't wait more than five minutes to call [address]! Fast action can save your life. Calling 911 is almost always the fastest way to get lifesaving treatment. Emergency Medical Services staff can begin treatment when they arrive  up to an hour sooner than if someone gets to the hospital by car. The discharge information has been reviewed with the patient. The patient verbalized understanding. Discharge medications reviewed with the patient and appropriate educational materials and side effects teaching were provided. ___________________________________________________________________________________________________________________________________ ITemahart Announcement We are excited to announce that we are making your provider's discharge notes available to you in ITemahart. You will see these notes when they are completed and signed by the physician that discharged you from your recent hospital stay. If you have any questions or concerns about any information you see in ITemahart, please call the Health Information Department where you were seen or reach out to your Primary Care Provider for more information about your plan of care. Introducing [facility]! Dear Yamilet Lee: Thank you for requesting a FOLUP account. Our records indicate that you already have an active FOLUP account. You can access your account anytime at [email or web address] Did you know that you can access your hospital and ER discharge instructions at any time in FOLUP? You can also review all of your test results from your hospital stay or ER visit. Additional Information If you have questions, please visit the Frequently Asked Questions section of the FOLUP website at [email or web address] Remember, FOLUP is NOT to be used for urgent needs. For medical emergencies, dial 911. Now available from your iPhone and Android! Unresulted Labs-Please follow up with your PCP about these lab tests Order Current Status STONE ANALYSIS, URINARY In process Providers Seen During Your Hospitalization Provider Specialty Primary office phone Eduardo Govea MD Urology [phone] Your Primary Care Physician (PCP) Primary Care Physician Office Phone Office Fax Ryan Smith [phone] You are allergic to the following Allergen Reactions Bactrim (Sulfamethoxazole-Trimethoprim) Hives Codeine Rash Pcn (Penicillins) Other (comments) UNKNOWN if allergy-has never had it but sister and mom are allergic Recent Documentation Height Weight BMI OB Status Smoking Status 1.651 m 83 kg 30.45 kg/m2 Hysterectomy Former Smoker Emergency Contacts Name Discharge Info Relation Home Work Mobile ElvisRonan DISCHARGE CAREGIVER [3] Spouse [3]   [phone] Patient Belongings  The following personal items are in your possession at time of discharge: 
  Dental Appliances: None  Visual Aid: Glasses, With patient      Home Medications: None   Jewelry: None  Clothing: Pants, Undergarments, Footwear, Shirt, Jacket/Coat    Other Valuables: Eyeglasses, Wallet, Purse (ipad) Discharge Instructions Attachments/References KIDNEY STONE (ENGLISH) Patient Handouts Kidney Stone: Care Instructions Your Care Instructions Kidney stones are formed when salts, minerals, and other substances normally found in the urine clump together. They can be as small as grains of sand or, rarely, as large as golf balls. While the stone is traveling through the ureter, which is the tube that carries urine from the kidney to the bladder, you will probably feel pain. The pain may be mild or very severe. You may also have some blood in your urine. As soon as the stone reaches the bladder, any intense pain should go away. If a stone is too large to pass on its own, you may need a medical procedure to help you pass the stone. The doctor has checked you carefully, but problems can develop later. If you notice any problems or new symptoms, get medical treatment right away. Follow-up care is a key part of your treatment and safety. Be sure to make and go to all appointments, and call your doctor if you are having problems. It's also a good idea to know your test results and keep a list of the medicines you take. How can you care for yourself at home? · Drink plenty of fluids, enough so that your urine is light yellow or clear like water. If you have kidney, heart, or liver disease and have to limit fluids, talk with your doctor before you increase the amount of fluids you drink. · Take pain medicines exactly as directed. Call your doctor if you think you are having a problem with your medicine. ¨ If the doctor gave you a prescription medicine for pain, take it as prescribed. ¨ If you are not taking a prescription pain medicine, ask your doctor if you can take an over-the-counter medicine. Read and follow all instructions on the label. · Your doctor may ask you to strain your urine so that you can collect your kidney stone when it passes. You can use a kitchen strainer or a tea strainer to catch the stone. Store it in a plastic bag until you see your doctor again. Preventing future kidney stones Some changes in your diet may help prevent kidney stones. Depending on the cause of your stones, your doctor may recommend that you: · Drink plenty of fluids, enough so that your urine is light yellow or clear like water. If you have kidney, heart, or liver disease and have to limit fluids, talk with your doctor before you increase the amount of fluids you drink. · Limit coffee, tea, and alcohol. Also avoid grapefruit juice. · Do not take more than the recommended daily dose of vitamins C and D. 
· Avoid antacids such as Gaviscon, Maalox, Mylanta, or Tums. · Limit the amount of salt (sodium) in your diet. · Eat a balanced diet that is not too high in protein. · Limit foods that are high in a substance called oxalate, which can cause kidney stones. These foods include dark green vegetables, rhubarb, chocolate, wheat bran, nuts, cranberries, and beans. When should you call for help? Call your doctor now or seek immediate medical care if: 
? · You cannot keep down fluids. ? · Your pain gets worse. ? · You have a fever or chills. ? · You have new or worse pain in your back just below your rib cage (the flank area). ? · You have new or more blood in your urine. ? Watch closely for changes in your health, and be sure to contact your doctor if: 
? · You do not get better as expected. Where can you learn more? Go to http://blessing-luiz.info/. Enter P899 in the search box to learn more about \"Kidney Stone: Care Instructions. \" Current as of: May 12, 2017 Content Version: 11.4 © 6421-6829 [employer].  Care instructions adapted under license by [employer] (which disclaims liability or warranty for this information). If you have questions about a medical condition or this instruction, always ask your healthcare professional. [address] any warranty or liability for your use of this information. Please provide this summary of care documentation to your next provider. Signatures-by signing, you are acknowledging that this After Visit Summary has been reviewed with you and you have received a copy. Patient Signature:  ____________________________________________________________ Date:  ____________________________________________________________  
  
Bartolo Lion Provider Signature:  ____________________________________________________________ Date:  ____________________________________________________________

## 2018-03-12 NOTE — ANESTHESIA POSTPROCEDURE EVALUATION
Post-Anesthesia Evaluation and Assessment    Patient: Rhys Gale MRN: [id number]  SSN: [id number]    YOB: 1970  Age: 52 y.o. Sex: female       Cardiovascular Function/Vital Signs  Visit Vitals    /81    Pulse 75    Temp 36.5 °C (97.7 °F)    Resp 15    Ht 5' 5\" (1.651 m)    Wt 83 kg (183 lb)    SpO2 96%    BMI 30.45 kg/m2       Patient is status post general anesthesia for Procedure(s):  right  PERCUTANEOUS NEPHROLITHOTOMY. Nausea/Vomiting: None    Postoperative hydration reviewed and adequate. Pain:Managed    Neurological Status:   Neuro (WDL): Within Defined Limits (03/12/18 1645)   At baseline    Mental Status and Level of Consciousness: Alert and oriented     Pulmonary Status:   O2 Device: Nasal cannula (03/12/18 1645)   Adequate oxygenation and airway patent    Complications related to anesthesia: None    Post-anesthesia assessment completed.  No concerns    Signed By: Severo Cavazos CRNA     March 12, 2018

## 2018-03-12 NOTE — H&P
Endeina Leal  1970     ASSESSMENT:       ICD-10-CM ICD-9-CM     1. Kidney stone N20.0 592.0        1. Bilateral nephrolithiasis, largest measuring 21 mm on the right, which has increased from 9mm by her CT in 06/2016. It measures 18.8 mm x 4.5 mm by KUB today (02/21/18).     2. 5.26 x 3.56 x 3.27cm complex left renal cyst by JHON on 02/08/17. Will monitor with repeat imaging in the future.      3. Pyuria. 3+ leukocytes by UA today (02/21/18). Urine culture 10,000 Proteus sensitive to Cipro, treated for the last 10 days. Urinalysis today is pending.     PLAN:    1. Urine culture today - pending. Will contact her with the results and treat accordingly. 2. Reviewed her previous imaging studies (see below) and new CT from [address] [place] [address] in [place]. 3. Her KUB today (02/21/18) revealed an 18.8 mm x 4.5 mm calculus overlying right lower pole kidney. 4. Discussed possible treatment options including ESWL, ureteroscopy/laser lithotripsy, PCNL, etc. Reviewed the risks, benefits, expectations, and outcomes of each option. Explained that PCNL would be the best option since her stone is too large for ESWL and difficult to reach with ureteroscopy, conferred with partners and they felt PCNL was better option as well. 5. Right PCNL discussed in detail. I will review KUBs and CT with partner for 2nd opinion. If in agreement will proceed with PCNL.     Patient's BMI is out of the normal parameters. Information about BMI was given and patient was advised to follow-up with their PCP for further management.     No chief complaint on file.     HISTORY OF PRESENT ILLNESS:   Vikas Delgado is a 52 y.o.  female who presents in follow up for her nephrolithiasis. She has been doing well since her last visit. She does not have any new complaints. She reports occasional right flakk pain, but she has DDD. She is currently asymptomatic. No dysuria, gross hematuria, abdominal pain, or flank pain.    Patient underwent a NCCT a/p on 01/19/17 in evaluation for nausea, vomiting, and right flank pain. It showed bilateral non-obstructing renal stones with the largest measuring 21mm on the right kidney, which had increased from 9mm on her CT in 06/2016. F/u JHON from 02/08/17 showed bilateral renal stones with the largest measuring 13mm in the right kidney. It also showed a 5.26cm mildly complex left renal cyst. F/u KUB from 06/29/17 showed a 16 x 6mm calcification over the left kidney consistent with her known left renal stone.      Patient has a history of fibroids.     Review of Systems  Constitutional: Fever: No  Skin: Rash: No  HEENT: Hearing difficulty: No  Eyes: Blurred vision: No  Cardiovascular: Chest pain: No  Respiratory: Shortness of breath: No  Gastrointestinal: Nausea/vomiting: No  Musculoskeletal: Back pain: Yes  Neurological: Weakness: No  Psychological: Memory loss: No  Comments/additional findings:       AUA Symptom Score 6/29/2017   Over the past month how often have you had the sensation that your bladder was not completely empty after you finished urinating? 1   Over the past month, how often have had to urinate again less than 2 hours after you last finished urinating? 2   Over the past month, how often have you found you stopped and started again several times when you urinated? 2   Over the past month, how often have you found it difficult to postpone urination? 1   Over the past month, how often have you had a weak urinary stream? 2   Over the past month, how often have you had to push or strain to begin urinating? 2   Over the past month, how many times did you most typically get up to urinate from the time you went to bed at night until the time you got up in the morning? 3   AUA Score 13   If you were to spend the rest of your life with your urinary condition the way it is now, how would you feel about that?  Mostly satisified           Past Medical History:   Diagnosis Date    Arthropathy      Asthma      Biliary dyskinesia      Complex renal cyst      Diverticulitis      History of kidney stones 2010    History of UTI      Hypocalcemia      Hypoparathyroidism ([facility] [address]      Hypothyroidism      Interstitial cystitis 2016     pt reported 7/20/2016    Kidney stones      Ovarian cyst      Right flank pain      Vitamin D deficiency        Past Surgical History:   Procedure Laterality Date    HX DILATION AND CURETTAGE        HX LAP CHOLECYSTECTOMY   10/14/2013    HX LAPAROTOMY        HX OTHER SURGICAL   04/29/2011     Cysto, bilateral ureterolysis, vaginal vault suspension, left ovarian cystectomy, enterolysis, lap hysterectomy. Dr. Alexandria Cat.  HX THYROIDECTOMY   2007    HX TONSILLECTOMY        HX TUBAL LIGATION                 Social History   Substance Use Topics    Smoking status: Never Smoker    Smokeless tobacco: Never Used    Alcohol use No         Comment: denies            Allergies   Allergen Reactions    Bactrim [Sulfamethoxazole-Trimethoprim] Hives    Codeine Rash    Pcn [Penicillins] Other (comments)       UNKNOWN if allergy-has never had it but sister and mom are allergic            Family History   Problem Relation Age of Onset    Cancer Father      Arthritis-osteo Mother      Asthma Mother      Hypertension Mother      Thyroid Disease Other               Current Outpatient Prescriptions   Medication Sig Dispense Refill    estradiol 0.025 mg/24 hr ptsw by TransDERmal route.        Lisdexamfetamine (VYVANSE) 70 mg capsule Take 70 mg by mouth every morning.        traZODone (DESYREL) 100 mg tablet Take 100 mg by mouth nightly.        fluticasone (FLONASE) 50 mcg/actuation nasal spray 2 Sprays by Both Nostrils route daily.        ziprasidone hcl (GEODON) 60 mg capsule Take 60 mg by mouth two (2) times daily (with meals).        promethazine (PHENERGAN) 12.5 mg tablet Take 1 Tab by mouth every six (6) hours as needed for Nausea.  12 Tab 0    thyroid, Pork, (ARMOUR THYROID) 60 mg tablet Take 60 mg by mouth daily.        montelukast (SINGULAIR) 10 mg tablet Take 10 mg by mouth daily.        hydrochlorothiazide (MICROZIDE) 12.5 mg capsule Take 12.5 mg by mouth daily.        spironolactone (ALDACTONE) 25 mg tablet Take  by mouth daily.        PARoxetine (PAXIL) 40 mg tablet Take 40 mg by mouth daily.        potassium chloride (K-DUR, KLOR-CON) 20 mEq tablet Take  by mouth two (2) times a day.        magnesium oxide (MAG-OX) 400 mg tablet Take 400 mg by mouth daily.        calcium-cholecalciferol, d3, 600-125 mg-unit tab Take  by mouth.        albuterol (PROVENTIL HFA, VENTOLIN HFA, PROAIR HFA) 90 mcg/actuation inhaler Take  by inhalation.          Physical Exam:         Visit Vitals    /76    Ht 5' 5\" (1.651 m)    Wt 189 lb (85.7 kg)    BMI 31.45 kg/m2     Visit Vitals    Ht 5' 5\" (1.651 m)    Wt 186 lb (84.4 kg)    BMI 30.95 kg/m2     Constitutional: Well developed, well-nourished female in no acute distress. CV:  No peripheral swelling noted, RRR  Respiratory: No respiratory distress or difficulties, CTAB  Abdomen:  Soft and nontender. No masses. No hernias, normal bowel sounds.  Female:  No CVA tenderness. Skin: No bruising or rashes. No petechia. Neuro/Psych:  Patient with appropriate affect. Alert and oriented.     Lymphatic:   No enlargement of inguinal lymph nodes.       REVIEW OF LABS AND IMAGING:          Results for orders placed or performed in visit on 02/21/18   AMB POC URINALYSIS DIP STICK AUTO W/O MICRO   Result Value Ref Range     Color (UA POC) Yellow       Clarity (UA POC) Cloudy       Glucose (UA POC) Negative Negative     Bilirubin (UA POC) Negative Negative     Ketones (UA POC) Negative Negative     Specific gravity (UA POC) 1.020 1.001 - 1.035     Blood (UA POC) 1+ Negative     pH (UA POC) 7.0 4.6 - 8.0     Protein (UA POC) Negative Negative     Urobilinogen (UA POC) 0.2 mg/dL 0.2 - 1     Nitrites (UA POC) Negative Negative     Leukocyte esterase (UA POC) 3+ Negative   AMB POC PVR, DAPHNEY,POST-VOID RES,US,NON-IMAGING   Result Value Ref Range      cc      KUB (02/21/2018): Impression:  18.8 mm x 4.5 mm calculus overlying right lower pole kidney.     Retroperitoneal U/S (02/08/2017):  Narrative: The LEFT kidney measures 10.9 x 4.56 x 6.37cm. There was a complex cyst noted superior kidney measuring 5.26 x 3.56 x 3.27cm. There was a 5mm non-obstructing stone noted inferior kidney. Normal echotexture and blood flow by color Doppler noted throughout. There was no hydronephrosis noted. The RIGHT kidney measures 11.2 x 5.91 x 4.46cm. There was a 13mm non-obstructing stone noted inferior kidney. Normal echotexture and blood flow by color Doppler noted throughout. There was no hydronephrosis noted. Impression:    Left complex renal cyst.  Bilateral non-obstructing stones. Normal bladder with bilateral urine jets     CT Abdomen and Pelvis without Contrast (01/19/2017):       Retroperitoneal US (07/12/2016):  RIGHT:  The right kidney measures 11 cm in length.  There is normal cortical thickness and echotexture. No solid or cystic renal mass identified.        There is no collecting system dilatation or evidence of obstruction. 2.5 cm shadowing stone at the lower pole correlate with finding on CT (2.7 x 0.9 cm). No adjacent free fluid or fluid collection. A dilated proximal right ureter is not identified.       LEFT:  The left kidney measures 10.8 cm in length.  There is normal cortical thickness and echotexture.  Upper pole simple cyst measures 3.3 x 2.0 x 2.9 cm.     There is no collecting system dilatation or evidence of obstruction.  No renal calculi are evident.  No adjacent free fluid or fluid collections.  The proximal ureter does not appear dilated.       The bladder is under distended and not optimally evaluated.          CT Urogram (06/28/2016):  Unenhanced images:A calculus in the right kidney measures 9 mm. There is a 1-2 mm calculus in the lower pole of left kidney. There are no calculi in the ureters or bladder.      Low attenuating lesion in the upper pole of the left kidney has an attenuation of -3 Hounsfield units.      Contrast enhanced images: The kidneys enhance symmetrically. There is no cortical mass in the right kidney. The mass in the upper pole the left kidney has attenuation of -3 Hounsfield units and measures 3.6 cm. In 2013, it measured 2.3 cm. There are no enhancing masses in the left kidney.      Impression: 1. Bilateral intrarenal calculi. 2.Unilocular cyst in the left kidney. 3.No enhancing mass in the kidneys or evidence of bladder mass to explain microhematuria. 4. Cholecystectomy. Normal biliary tree.   5. Hysterectomy and suspected appendectomy      Ishmael Powers MD

## 2018-03-12 NOTE — BRIEF OP NOTE
BRIEF OPERATIVE NOTE    Date of Procedure: 3/12/2018   Preoperative Diagnosis: kidney stone n20.0, Right lower pole partial Staghorn Calculus. Postoperative Diagnosis: same  Procedure(s):  right  PERCUTANEOUS NEPHROLITHOTOMY  Surgeon(s) and Role:     * Molly Young MD - Primary         Assistant Staff: None      Surgical Staff:  Circ-1: Abbie Evangelista RN  Radiology Technician: Ryan Peñaloza  Scrub Tech-1: Domenico Momin  Event Time In   Incision Start  2:10 PM   Incision Close  3:30 PM     Anesthesia: General   Estimated Blood Loss: 150cc  Specimens:   ID Type Source Tests Collected by Time Destination   1 : RIGHT RENAL STONES FOR ANALYSIS  Kidney, Right  Molly Young MD 3/12/2018  3:40 PM Pathology      Findings: Right lower pole calculus with extension into the right renal infundibulum but not to renal pelvis. Fluoroscopically all stones appear to be out   Complications: none  Implants/Drains:  20 fr [place] tip salgado as right PCN tube with 5 fr ureteral access catheter. 16 fr salgado. Plans:  Overnight stay.     Molly Young MD

## 2018-03-12 NOTE — PROGRESS NOTES
VASCULAR & INTERVENTIONAL RADIOLOGY PROGRESS NOTE    History and Physical reviewed; I have examined the patient and there are no pertinent changes. Pt is an appropriate candidate to undergo image guided right nephrostomy under moderate sedation.       Liliana Sheets MD  Vascular & Interventional Radiology  [facility]  3/12/2018

## 2018-03-12 NOTE — PERIOP NOTES
PACU Summary  Patient arrived to PACU at 1542  Bedside/Verbal report received from Gregor Vega RN  Vitals:    03/12/18 1604 03/12/18 1606 03/12/18 1609 03/12/18 1619   BP: 135/86  126/86 129/76   Pulse: 77  78 72   Resp: 15  14 14   Temp:       SpO2: (!) 89% 90% 95% 96%   Weight:       Height:         Cardiac rhythm: Normal Sinus Rhythm. Lines and Drains  Peripheral Intravenous Line:   Peripheral IV 03/12/18 Left Hand (Active)   Site Assessment Clean, dry, & intact 3/12/2018  3:42 PM   Phlebitis Assessment 0 3/12/2018  3:42 PM   Infiltration Assessment 0 3/12/2018  3:42 PM   Dressing Status Clean, dry, & intact 3/12/2018  3:42 PM   Dressing Type Transparent;Tape 3/12/2018  3:42 PM   Hub Color/Line Status Pink;Flushed 3/12/2018  4:19 PM    and Drain(s):   Nephrostomy Tube 03/12/18 Flank (Active)   Site Assessment Clean, dry, & intact 3/12/2018  3:42 PM   Dressing Status Clean, dry, & intact 3/12/2018  3:42 PM   Drainage Description Sanguinous 3/12/2018  3:42 PM   Status Draining 3/12/2018  3:42 PM       Wound  Wound Flank Right (Active)   DRESSING STATUS Clean, dry, and intact 3/12/2018  3:42 PM   DRESSING TYPE 4 x 4;ABD pad;Transparent film 3/12/2018  3:42 PM   Drainage Amount  None 3/12/2018  3:42 PM   Number of days:0            Intake and Output    Intake/Output Summary (Last 24 hours) at 03/12/18 1636  Last data filed at 03/12/18 1619   Gross per 24 hour   Intake                0 ml   Output              285 ml   Net             -285 ml     4 mg Zofran given at 1630 for nausea. patient is on 2 L nasal O2 for sleep apnea, CPAP is not present and patient lives in [place]. Dr. Della Alvarenga to speak with family in the waiting room. Report called to Jordy Correa RN in [address] [place] [address] at 1640  Family updated and given room number.   Patient transported to 2214 at [facility]

## 2018-03-12 NOTE — PROCEDURES
Vascular & Interventional Radiology Brief Procedure Note    Interventional Radiologist: Radha Cheney    Pre-operative Diagnosis:  Enlarging lower pole right renal calculus. Post-operative Diagnosis: Same as pre-op dx    Procedure(s) Performed:  Fluoroscopic guided right nephroureteral catheter placement. Anesthesia:  Local and Moderate Sedation    Findings:    1. Successful placement of right nephroureteral catheter via lower pole calyx containing calculus. Tip in bladder. Complications: None    Estimated Blood Loss:  minimal    Tubes and Drains: 4 Fr glide catheter. Specimens: None    Condition: Good    Disposition: To OR    Plan: For lithotripsy.       Nabil Royal MD  [facility]  Vascular & Interventional Radiology  3/12/2018

## 2018-03-13 ENCOUNTER — APPOINTMENT (OUTPATIENT)
Dept: CT IMAGING | Age: 48
End: 2018-03-13
Attending: UROLOGY
Payer: COMMERCIAL

## 2018-03-13 LAB
ANION GAP SERPL CALC-SCNC: 8 MMOL/L (ref 3–18)
BACTERIA SPEC CULT: NORMAL
BUN SERPL-MCNC: 12 MG/DL (ref 7–18)
BUN/CREAT SERPL: 16 (ref 12–20)
CALCIUM SERPL-MCNC: 7.1 MG/DL (ref 8.5–10.1)
CHLORIDE SERPL-SCNC: 107 MMOL/L (ref 100–108)
CO2 SERPL-SCNC: 24 MMOL/L (ref 21–32)
CREAT SERPL-MCNC: 0.73 MG/DL (ref 0.6–1.3)
ERYTHROCYTE [DISTWIDTH] IN BLOOD BY AUTOMATED COUNT: 15.7 % (ref 11.6–14.5)
GLUCOSE BLD STRIP.AUTO-MCNC: 121 MG/DL (ref 70–110)
GLUCOSE BLD STRIP.AUTO-MCNC: 121 MG/DL (ref 70–110)
GLUCOSE BLD STRIP.AUTO-MCNC: 130 MG/DL (ref 70–110)
GLUCOSE BLD STRIP.AUTO-MCNC: 142 MG/DL (ref 70–110)
GLUCOSE SERPL-MCNC: 108 MG/DL (ref 74–99)
HCT VFR BLD AUTO: 32.2 % (ref 35–45)
HGB BLD-MCNC: 10.6 G/DL (ref 12–16)
MCH RBC QN AUTO: 28.5 PG (ref 24–34)
MCHC RBC AUTO-ENTMCNC: 32.9 G/DL (ref 31–37)
MCV RBC AUTO: 86.6 FL (ref 74–97)
PLATELET # BLD AUTO: 329 K/UL (ref 135–420)
PMV BLD AUTO: 9.8 FL (ref 9.2–11.8)
POTASSIUM SERPL-SCNC: 4.5 MMOL/L (ref 3.5–5.5)
RBC # BLD AUTO: 3.72 M/UL (ref 4.2–5.3)
SERVICE CMNT-IMP: NORMAL
SODIUM SERPL-SCNC: 139 MMOL/L (ref 136–145)
WBC # BLD AUTO: 24.2 K/UL (ref 4.6–13.2)

## 2018-03-13 PROCEDURE — 74011250636 HC RX REV CODE- 250/636: Performed by: UROLOGY

## 2018-03-13 PROCEDURE — 77030020263 HC SOL INJ SOD CL0.9% LFCR 1000ML

## 2018-03-13 PROCEDURE — 96376 TX/PRO/DX INJ SAME DRUG ADON: CPT

## 2018-03-13 PROCEDURE — 36415 COLL VENOUS BLD VENIPUNCTURE: CPT | Performed by: UROLOGY

## 2018-03-13 PROCEDURE — 99218 HC RM OBSERVATION: CPT

## 2018-03-13 PROCEDURE — 85027 COMPLETE CBC AUTOMATED: CPT | Performed by: UROLOGY

## 2018-03-13 PROCEDURE — 80048 BASIC METABOLIC PNL TOTAL CA: CPT | Performed by: UROLOGY

## 2018-03-13 PROCEDURE — 82962 GLUCOSE BLOOD TEST: CPT

## 2018-03-13 PROCEDURE — 96361 HYDRATE IV INFUSION ADD-ON: CPT

## 2018-03-13 PROCEDURE — 74176 CT ABD & PELVIS W/O CONTRAST: CPT

## 2018-03-13 PROCEDURE — 96365 THER/PROPH/DIAG IV INF INIT: CPT

## 2018-03-13 PROCEDURE — 96375 TX/PRO/DX INJ NEW DRUG ADDON: CPT

## 2018-03-13 PROCEDURE — 74011250637 HC RX REV CODE- 250/637: Performed by: UROLOGY

## 2018-03-13 RX ORDER — CIPROFLOXACIN 2 MG/ML
400 INJECTION, SOLUTION INTRAVENOUS EVERY 12 HOURS
Status: COMPLETED | OUTPATIENT
Start: 2018-03-13 | End: 2018-03-14

## 2018-03-13 RX ORDER — ESTRADIOL 0.03 MG/D
1 PATCH TRANSDERMAL
Status: DISCONTINUED | OUTPATIENT
Start: 2018-03-14 | End: 2018-03-14 | Stop reason: HOSPADM

## 2018-03-13 RX ADMIN — ZIPRASIDONE HYDROCHLORIDE 60 MG: 20 CAPSULE ORAL at 09:05

## 2018-03-13 RX ADMIN — Medication 10 ML: at 14:00

## 2018-03-13 RX ADMIN — POTASSIUM CHLORIDE 10 MEQ: 750 TABLET, EXTENDED RELEASE ORAL at 18:35

## 2018-03-13 RX ADMIN — PAROXETINE HYDROCHLORIDE 40 MG: 20 TABLET, FILM COATED ORAL at 09:05

## 2018-03-13 RX ADMIN — Medication 10 ML: at 21:23

## 2018-03-13 RX ADMIN — LEVOTHYROXINE, LIOTHYRONINE 60 MG: 19; 4.5 TABLET ORAL at 09:05

## 2018-03-13 RX ADMIN — CALCIUM CARBONATE 500 MG (1,250 MG)-VITAMIN D3 200 UNIT TABLET 1 TABLET: at 09:06

## 2018-03-13 RX ADMIN — POTASSIUM CHLORIDE 10 MEQ: 750 TABLET, EXTENDED RELEASE ORAL at 09:05

## 2018-03-13 RX ADMIN — DOCUSATE SODIUM 100 MG: 100 CAPSULE, LIQUID FILLED ORAL at 18:34

## 2018-03-13 RX ADMIN — SODIUM CHLORIDE 125 ML/HR: 900 INJECTION, SOLUTION INTRAVENOUS at 04:02

## 2018-03-13 RX ADMIN — HYDROMORPHONE HYDROCHLORIDE 1 MG: 2 INJECTION INTRAMUSCULAR; INTRAVENOUS; SUBCUTANEOUS at 00:26

## 2018-03-13 RX ADMIN — CIPROFLOXACIN 400 MG: 2 INJECTION, SOLUTION INTRAVENOUS at 09:30

## 2018-03-13 RX ADMIN — HYDROCHLOROTHIAZIDE 12.5 MG: 12.5 CAPSULE ORAL at 09:06

## 2018-03-13 RX ADMIN — SPIRONOLACTONE 25 MG: 25 TABLET, FILM COATED ORAL at 09:05

## 2018-03-13 RX ADMIN — DOCUSATE SODIUM 100 MG: 100 CAPSULE, LIQUID FILLED ORAL at 09:06

## 2018-03-13 RX ADMIN — SODIUM CHLORIDE 125 ML/HR: 900 INJECTION, SOLUTION INTRAVENOUS at 18:33

## 2018-03-13 RX ADMIN — MONTELUKAST SODIUM 10 MG: 10 TABLET, FILM COATED ORAL at 09:06

## 2018-03-13 RX ADMIN — HYDROMORPHONE HYDROCHLORIDE 2 MG: 2 TABLET ORAL at 18:35

## 2018-03-13 RX ADMIN — TRAZODONE HYDROCHLORIDE 100 MG: 100 TABLET ORAL at 21:22

## 2018-03-13 RX ADMIN — HYDROMORPHONE HYDROCHLORIDE 2 MG: 2 TABLET ORAL at 09:03

## 2018-03-13 RX ADMIN — FLUTICASONE PROPIONATE 2 SPRAY: 50 SPRAY, METERED NASAL at 09:30

## 2018-03-13 RX ADMIN — HYDROMORPHONE HYDROCHLORIDE 1 MG: 2 INJECTION INTRAMUSCULAR; INTRAVENOUS; SUBCUTANEOUS at 14:07

## 2018-03-13 RX ADMIN — Medication 400 MG: at 09:05

## 2018-03-13 RX ADMIN — HYDROMORPHONE HYDROCHLORIDE 1 MG: 2 INJECTION INTRAMUSCULAR; INTRAVENOUS; SUBCUTANEOUS at 04:28

## 2018-03-13 NOTE — PROGRESS NOTES
Urology Progress Note        Assessment/Plan:     Patient Active Problem List   Diagnosis Code    Nephrolithiasis N20.0    Staghorn renal calculus N20.0         Plan:  Increase ambulation, continue as IP for pain control. Remove salgado    Karissa Gallo MD    [phone]      Subjective:     Daily Progress Note: 3/13/2018 2:42 PM    Quiana Langston is doing fair. She reports pain is moderately controlled. She has complaints of  pain. She is tolerating a solid diet and ambulating without assistance. Indwelling catheter is draining well. Objective:     Visit Vitals    /63 (BP 1 Location: Right arm, BP Patient Position: Lying left side)    Pulse 75    Temp 98.4 °F (36.9 °C)    Resp 18    Ht 5' 5\" (1.651 m)    Wt 183 lb (83 kg)    SpO2 99%    BMI 30.45 kg/m2        Temp (24hrs), Av.9 °F (36.6 °C), Min:97.4 °F (36.3 °C), Max:98.4 °F (36.9 °C)      Intake and Output:  1901 -  0700  In: 1872.1 [P.O.:240; I.V.:1632.1]  Out: 0811 [Urine:1035]   0701 -  1900  In: 600 [P.O.:600]  Out: 700 [Urine:700]    PHYSICAL EXAMINATION:   Visit Vitals    /63 (BP 1 Location: Right arm, BP Patient Position: Lying left side)    Pulse 75    Temp 98.4 °F (36.9 °C)    Resp 18    Ht 5' 5\" (1.651 m)    Wt 183 lb (83 kg)    SpO2 99%    BMI 30.45 kg/m2     Constitutional: Well developed, well-nourished female in no acute distress. HEENT: Normocephalic, Atraumatic   CV:   RRR   Respiratory: No respiratory distress or difficulties breathing   Abdomen:  Soft and nontender. No masses. No hepatosplenomegaly. Rt PCN   Female:  CVA tenderness: yes                       Salgado: in place, draining well   Skin:  Normal color. No evidence of jaundice. Neuro/Psych:  Patient with appropriate affect. Alert and oriented.           Incision: clean    Lab/Data Review:    Labs: Results:   Chemistry    Recent Labs      18   0455  18   1615  18   1037   GLU  108*  126* 103*   NA  139  140  139   K  4.5  3.6  3.6   CL  107  107  105   CO2  24  26  27   BUN  12  14  13   CREA  0.73  0.81  0.84   CA  7.1*  7.7*  9.1   AGAP  8  7  7   BUCR  16  17  15      CBC w/Diff Recent Labs      03/13/18   0455  03/12/18   1615  03/12/18   1037   WBC  24.2*   --   12.2   RBC  3.72*   --   4.71   HGB  10.6*   --   13.8   HCT  32.2*  35.2  39.5   PLT  329   --   405      Cultures Recent Labs      03/12/18   1000   CULT  NO GROWTH 1 DAY     All Micro Results     None            Urinalysis Color   Date Value Ref Range Status   03/12/2018 YELLOW   Final     Appearance   Date Value Ref Range Status   03/12/2018 CLOUDY   Final     Specific gravity   Date Value Ref Range Status   03/12/2018 1.018 1.005 - 1.030   Final     pH (UA)   Date Value Ref Range Status   03/12/2018 8.0 5.0 - 8.0   Final     Protein   Date Value Ref Range Status   03/12/2018 NEGATIVE  NEG mg/dL Final     Ketone   Date Value Ref Range Status   03/12/2018 NEGATIVE  NEG mg/dL Final     Bilirubin   Date Value Ref Range Status   03/12/2018 NEGATIVE  NEG   Final     Blood   Date Value Ref Range Status   03/12/2018 SMALL (A) NEG   Final     Urobilinogen   Date Value Ref Range Status   03/12/2018 0.2 0.2 - 1.0 EU/dL Final     Nitrites   Date Value Ref Range Status   03/12/2018 NEGATIVE  NEG   Final     Leukocyte Esterase   Date Value Ref Range Status   03/12/2018 MODERATE (A) NEG   Final     Potassium   Date Value Ref Range Status   03/13/2018 4.5 3.5 - 5.5 mmol/L Final     Creatinine   Date Value Ref Range Status   03/13/2018 0.73 0.6 - 1.3 MG/DL Final     BUN   Date Value Ref Range Status   03/13/2018 12 7.0 - 18 MG/DL Final      PSA No results for input(s): PSA in the last 72 hours.    Coagulation Lab Results   Component Value Date/Time    Prothrombin time 11.9 03/12/2018 10:37 AM    INR 0.9 03/12/2018 10:37 AM    aPTT 26.4 03/12/2018 10:37 AM

## 2018-03-13 NOTE — PROGRESS NOTES
0800 Received pt resting on bed, eyes closed, IV site no sign of infiltration. Upon reassessment pt states she is in pain, pain medicated. Face a little flushed, as per pt it has been like that since yesterday she thinks its form the medications she's taking but she feels ok. Face warm, no itching, no rashes on other parts of the body, no shortness of breath. 0940 Ciprofloxacin administered, face turned a little bit more flushed. Checked pt, she said she is not itching, face just feels warm. No SOB, as per pt she feels comfortable. Offered cold wet face towels to put on her face. Will monitor. 1030 Pt denies any unusual feeling, no difficulty breathing, no rashes, no itching. 1410 Pain medicated. 1830 Michael taken out, dtv at 7[facility] Pt walked the hallway, 2 laps. Pt tolerated well. Did not complain of shortness of breath or dizziness, standby assist only. IV site no sign of infiltration. Face still flushed but not as much as this morning.  No itching, no rashes, no diff

## 2018-03-13 NOTE — PROGRESS NOTES
Report received from [place] RN,including sbar,mar,kardex. 2025 Medicated for pain with relief. Wound dressing dry and intact. Up to bathroom,had a bowel movement. 18 Medicated for pain with relief. 0430 medicated for pain with relief. Up standing beside bed.  0630 Called CT,they will send for patient at 0700. Urine output remains bloody. 0700 Patient to Ct via wheelchair. 0730 Returned same. Bedside shift change report given to [address] (oncoming nurse) by Helio Lilly (offgoing nurse). Report included the following information SBAR, Kardex and MAR.

## 2018-03-13 NOTE — PROGRESS NOTES
[facility]   Discharge Planning/ Assessment    Reasons for Intervention: Interviewed patient, she agrees to share her discharge information with her  , see below. Demographic information verified. She was independent prior to admission and sees Dr Loren Saleem  for her primary care needs. Her discharge plan is to return home.      High Risk Criteria  [x] Yes  []No   Physician Referral  [] Yes  [x]No        Date    Nursing Referral  [] Yes  [x]No        Date    Patient/Family Request  [] Yes  [x]No        Date       Resources:    Medicare  [] Yes  [x]No   Medicaid  [] Yes  [x]No   No Resources  [] Yes  [x]No   Private Insurance  [x] Yes  []No [employer]    Name/Phone Number    Other  [] Yes  [x]No        (i.e. Workman's Comp)         Prior Services:    Prior Services  [] Yes  [x]No   Home Health  [] Yes  [x]No   [address]  [] Yes  [x]No        Number of [address]  [] Yes  [x]No       Meals on Wheels  [] Yes  [x]No   Office on Aging  [] Yes  [x]No   Transportation Services  [] Yes  [x]No   Nursing Home  [] Yes  [x]No        Nursing Home Name    [address]  [] Yes  [x]No        [address] Name    Other       Information Source:      Information obtained from  [x] Patient  [] Parent   [] [address]   [] Child  [] Spouse   [] Significant Other/Partner   [] Friend      [] EMS    [] Nursing Home Chart          [x] Other:chart   Chart Review  [x] Yes  []No     Family/Support System:    Patient lives with  [] Alone    [x] Spouse   [] Significant Other  [] Children  [] Caretaker   [] Parent  [] Sibling     [] Other       Other Support System:    Is the patient responsible for care of others  [] Yes  [x]No   Information of person caring for patient on  discharge    Managers financial affairs independently  [x] Yes  []No   If no, explain:      Status Prior to Admission:    Mental Status  [x] Awake  [] Alert  [x] Oriented  [] Quiet/Calm [] Lethargic/Sedated   [] Disoriented  [] Restless/Anxious  [] Combative   Personal Care  [] Dependent  [x] Independent Personal Care  [] Requires Assistance   Meal Preparation Ability  [x] Independent   [] Standby Assistance   [] Minimal Assistance   [] Moderate Assistance  [] Maximum Assistance     [] Total Assistance   Chores  [x] Independent with Chores   [] N/A Nursing Home Resident   [] Requires Assistance   Bowel/Bladder  [x] Continent  [] Catheter  [] Incontinent  [] Ostomy Self-Care    [] Urine Diversion Self-Care  [] Maximum Assistance     [] Total Assistance   Number of Persons needed for assistance    DME at home  [] Andi Jacob  [] Edinson Jacob   [] Commode    [] Bathroom/Grab Bars  [] Hospital Bed  [] Nebulizer  [] Oxygen           [] Raised Toilet Seat  [] Shower Chair  [] Side Rails for Bed   [] Tub Transfer Bench   [] Ailyn Melanie  [] Titi Aloe, Standard      [] Other:   Vendor      Treatment Presently Receiving:    Current Treatments  [] Chemotherapy  [] Dialysis  [] Insulin  [] IVAB [x] IVF   [] O2  [] PCA   [] PT   [] RT   [] Tube Feedings   [] Wound Care     Psychosocial Evaluation:    Verbalized Knowledge of Disease Process  [x] Patient  [x]Family   Coping with Disease Process  [x] Patient  [x]Family   Requires Further Counseling Coping with Disease Process  [] Patient  []Family     Identified Projected Needs:    Home Health Aid  [] Yes  [x]No   Transportation  [] Yes  [x]No   Education  [] Yes  [x]No        Specific Education     Financial Counseling  [] Yes  [x]No   Inability to Care for Self/Will Require 24 hour care  [] Yes  [x]No   Pain Management  [] Yes  [x]No   Home Infusion Therapy  [] Yes  [x]No   Oxygen Therapy  [] Yes  [x]No   DME  [] Yes  [x]No   Long Term Care Placement  [] Yes  [x]No   Rehab  [] Yes  [x]No   Physical Therapy  [] Yes  [x]No   Needs Anticipated At This Time  [] Yes  [x]No     Intra-Hospital Referral:    [facility]  [] Yes  [x]No     [] Yes  [x]No Patient Representative  [] Yes  [x]No   Staff for Teaching Needs  [] Yes  [x]No   Specialty Teaching Needs     Diabetic Educator  [] Yes  [x]No   Referral for Diabetic Educator Needed  [] Yes  [x]No  If Yes, place order for Nutritionist or Diabetic Consult     Tentative Discharge Plan:    Home with No Services  [x] Yes  []No   Home with [address]  [] Yes  [x]No        If Yes, specify type    Home Care Program  [] Yes  [x]No        If Yes, specify type    Meals on Wheels  [] Yes  [x]No   Office of Aging  [] Yes  [x]No   NHP  [] Yes  [x]No   Return to the Nursing Home  [] Yes  [x]No   Rehab Therapy  [] Yes  [x]No   Acute Rehab  [] Yes  [x]No   Subacute Rehab  [] Yes  [x]No   Private Care  [] Yes  [x]No   Substance Abuse Referral  [] Yes  [x]No   Transportation  [] Yes  [x]No   Chore Service  [] Yes  [x]No   Inpatient Hospice  [] Yes  [x]No   OP RT  [] Yes  [x] No   OP Hemo  [] Yes  [x] No   OP PT  [] Yes  [x]No   Support Group  [] Yes  [x]No   Reach to Recovery  [] Yes  [x]No   OP Oncology Clinic  [] Yes  [x]No   Clinic Appointment  [] Yes  [x]No   DME  [] Yes  [x]No   Comments    Name of D/C Planner or  Given to Patient or Family Kimber Sullivan RN   Phone Number [phone] extension [phone]   Date March 13, 2018   Time 808 am   If you are discharged home, whom do you designate to participate in your discharge plan and receive any information needed?      Enter name of designee Xiomara Liriano  spouse        Phone # of designee [phone]        Address of [address] [place] [address] , [address]        Updated March 13, 2018        Patient refused to designate any           individual

## 2018-03-13 NOTE — PROGRESS NOTES
Patient has designated her spouse to participate in his/her discharge plan and to receive any needed information.      Name:   Rob Castro  spouse   Jennifer Arce [id number]   [address] [place] [address] , [address] [facility]   March 13, 2018     Address:  Phone number:

## 2018-03-13 NOTE — OP NOTES
[address] REPORT    Jeffery Phillips  MR#: [id number]  : 1970  ACCOUNT #: [de-identified]   DATE OF SERVICE: 2018    PREOPERATIVE DIAGNOSIS:  Right lower pole partial staghorn calculus. POSTOPERATIVE DIAGNOSIS:  Right lower pole partial staghorn calculus. PROCEDURE PERFORMED:  Right percutaneous nephrostolithotomy. SURGEON:  Germania Mix MD.     ASSISTANT:  Dr. Dm Hansen MD     PROCEDURE TIME:  Procedure start 2:10 p.m., procedure end 3:30 p.m. ANESTHESIA:  General.    ESTIMATED BLOOD LOSS:  150 mL. SPECIMENS REMOVED:  Right renal stones for analysis. FINDINGS:  Right lower pole renal calculus with extension into right renal infundibulum, but not into the renal pelvis. Fluoroscopically, all stones were felt to be out of the renal collecting system. There were no gross stones seen on fluoroscopy and direct vision. COMPLICATIONS:  None. IMPLANTS/DRAINS:  A 20-St Helenian Councill tip Rodriguez catheter, right percutaneous nephrostomy tube and 5-St Helenian ureteral access catheter, 16-St Helenian Rodriguez catheter. PLAN:  Overnight stay, observe for bleeding and placed on bed rest.  We will observe and make sure tomorrow that her H and H is stable and we will obtain a CT scan to make sure there are no significant residual stones. INDICATIONS:  This is a pleasant 70-year-old female with known nephrolithiasis. The patient has been followed by another urologist and the known right renal calculus in the lower pole. This is now 21 mm. It was a 13 mm in the past.  The patient now presents for definitive therapy for this.   We discussed options and felt that ureteroscopic extraction would be difficult based on the patient's CT scan and felt that percutaneous nephrostolithotomy was in her best interest.  We discussed the potential risk of bleeding, infection, incomplete stone fragmentation requiring secondary therapies and the potential for significant blood loss.  The patient understands this and desires to proceed. PROCEDURE IN DETAIL:  The patient was identified in the holding area, given informed consent and was then taken to the open surgical suite and placed on the Ardean Kim spine table in the prone position after general anesthetic was performed in the supine position. After placement and appropriate padding on the Ardean Kim table, timeout was taken, all in agreement on procedure, laterality, burn precautions, beta blocker concerns were addressed. SCDs were placed for DVT prophylaxis. After all pressure points had been addressed and the patient was situated with Rodriguez catheter in place in the prone position on the Ardean Kim spine table, we then proceeded with formal nephrostolithotomy. Proper antibiotics had been administered. SCDs were placed for DVT prophylaxis and we had made formal timeout. Under fluoroscopic control, we exchanged the 5-Luxembourger open-ended catheter placed by interventional radiologist from the lower pole extending around to the renal pelvis and down the right ureter. At this point, we had placed a 0.038 Sensor wire down the open-ended catheter, removed the Sensor wire and then using the fascial dilator, I cut the lumbodorsal fascia initially with the incising knife in a cross manner. Once this was done, I then used the double open-ended ureteral catheter and positioned this in the proximal ureter and then placed a second Sensor wire. Once I had two Sensor wires in place, I then extended the percutaneous nephrostomy puncture and expanded this to fit the tip of my finger. Once I did this, I then place the high-pressure balloon dilator and placed the compressed balloon to the level of the lower pole calyceal stone on the right side and there was ample balloon coming out of the patient's right flank incision. At this point, I then used the high pressure balloon dilator and dilated the balloon to 10 atmospheres.   Once this was performed, there was a very straight shot to the lower pole and using the 30-Solomon Islander sheath, I was able to place the percutaneous nephrostomy sheath to the level of the stone. I then used the rigid nephroscope and obtained access into the kidney. We found the stone. I was able to use the ultrasonic lithotripter and was able to pulverize and suction out the infundibular portion of the stone and then redirect to the lower pole and I was able to fragment this completely and I believe all stone fragments were removed based on the finding on fluoroscopy and under direct vision. There was some moderate bleeding that occurred at this point and visualization was difficult, but I was able to see no further stones. I then used the flexible scope and went up into the renal pelvis and again saw no further stones. No further stones were also seen in the upper pole as well. I felt like we had obtained adequate positioning. After this, I removed the nephroscope and placed a 20-Solomon Islander open-ended catheter and a 5-Solomon Islander open-ended ureteral access catheter through the 20-Solomon Islander Councill tip catheter. This was placed in the lower pole. I secured this in place, placed 2 mL of fluid in the balloon as well to secure it in place. Once I did this, I felt that there was good positioning of this. I did do a nephrogram which showed no significant extravasation, and showed a nice nephrogram with dye going down the ureter. We felt that there was no need for further evaluation. The nephrostomy was secured in place with two 2-0 nylon sutures. The yellow jacket 5-Solomon Islander open-ended ureteral catheter was left in place through the catheter. I ensured that it was in the proximal ureter.   At this point, I then proceeded with further nephrostogram showing no significant changes from the first one and I again removed the nephrolithotomy drape and placed a dressing of ABDs and 4 x 4's over the patient's flank with a bioclusive dressing and secured the Rodriguez catheter in place with 2-0 silk tape. The procedure was terminated. The Rodriguez catheter was left in place draining clotted blood in the Rodriguez so I irrigated   that. This cleared rapidly. There was no significant further bleeding. The procedure was terminated. The patient was taken to recovery in stable condition. She will be observed overnight.       MD CHALO Price / SANIYA  D: 03/12/2018 23:51     T: 03/13/2018 09:26  JOB #: [id number]

## 2018-03-13 NOTE — PROGRESS NOTES
conducted an initial consultation and Spiritual Assessment for [facility], who is a 52 y.o.,female. Patients Primary Language is: [place]. According to the patients EMR Rastafari Affiliation is: Non Druze.     The reason the Patient came to the hospital is:   Patient Active Problem List    Diagnosis Date Noted    Staghorn renal calculus 03/12/2018    Nephrolithiasis 08/09/2016        The  provided the following Interventions:  Initiated a relationship of care and support with patient in room 2214 this morning. Listened empathically as patient talked about her being here and how things have been since her surgery yesterday. Provided information about Spiritual Care Services. Offered prayer and assurance of continued prayers on patients behalf. The following outcomes were achieved:  Patient shared limited information about her medical narrative. .  Patient processed feeling about current hospitalization. Patient expressed gratitude for pastoral care visit. Assessment:  Patient does not have any Christianity/cultural needs that will affect patients preferences in health care. There are no further spiritual or Christianity issues which require Spiritual Care Services interventions at this time. Plan:  Chaplains will continue to follow and will provide pastoral care on an as needed/requested basis    . Miquel [place]   Spiritual Care   [phone]

## 2018-03-13 NOTE — PROGRESS NOTES
Post op check      Patient is uncomfortable but not unexpected. Will make her at bedrest tonight and then get out of bed in AM.  Change to parentral analgesics. HCT down to 35.7 from 39. Lytes WNL postoperatively. Will monitor and get CT tomorrow to investigate stone burden.     Fany Arriola MD

## 2018-03-13 NOTE — PROGRESS NOTES
Problem: Falls - Risk of  Goal: *Absence of Falls  Document Portia Fall Risk and appropriate interventions in the flowsheet.    Outcome: Progressing Towards Goal  Fall Risk Interventions:  Mobility Interventions: Communicate number of staff needed for ambulation/transfer, Strengthening exercises (ROM-active/passive)    Mentation Interventions: Bed/chair exit alarm, Evaluate medications/consider consulting pharmacy, Increase mobility, More frequent rounding    Medication Interventions: Evaluate medications/consider consulting pharmacy, Patient to call before getting OOB, Teach patient to arise slowly    Elimination Interventions: Call light in reach, Patient to call for help with toileting needs, Toileting schedule/hourly rounds    History of Falls Interventions: Evaluate medications/consider consulting pharmacy

## 2018-03-14 VITALS
OXYGEN SATURATION: 95 % | RESPIRATION RATE: 18 BRPM | BODY MASS INDEX: 30.49 KG/M2 | DIASTOLIC BLOOD PRESSURE: 85 MMHG | SYSTOLIC BLOOD PRESSURE: 135 MMHG | HEART RATE: 112 BPM | HEIGHT: 65 IN | WEIGHT: 183 LBS | TEMPERATURE: 98.1 F

## 2018-03-14 LAB
BASOPHILS # BLD: 0 K/UL (ref 0–0.06)
BASOPHILS NFR BLD: 0 % (ref 0–2)
DIFFERENTIAL METHOD BLD: ABNORMAL
EOSINOPHIL # BLD: 0.3 K/UL (ref 0–0.4)
EOSINOPHIL NFR BLD: 2 % (ref 0–5)
ERYTHROCYTE [DISTWIDTH] IN BLOOD BY AUTOMATED COUNT: 16.1 % (ref 11.6–14.5)
GLUCOSE BLD STRIP.AUTO-MCNC: 106 MG/DL (ref 70–110)
GLUCOSE BLD STRIP.AUTO-MCNC: 79 MG/DL (ref 70–110)
HCT VFR BLD AUTO: 28.2 % (ref 35–45)
HGB BLD-MCNC: 9.3 G/DL (ref 12–16)
LYMPHOCYTES # BLD: 3.3 K/UL (ref 0.9–3.6)
LYMPHOCYTES NFR BLD: 23 % (ref 21–52)
MCH RBC QN AUTO: 29 PG (ref 24–34)
MCHC RBC AUTO-ENTMCNC: 33 G/DL (ref 31–37)
MCV RBC AUTO: 87.9 FL (ref 74–97)
MONOCYTES # BLD: 0.8 K/UL (ref 0.05–1.2)
MONOCYTES NFR BLD: 5 % (ref 3–10)
NEUTS SEG # BLD: 10.2 K/UL (ref 1.8–8)
NEUTS SEG NFR BLD: 70 % (ref 40–73)
PLATELET # BLD AUTO: 288 K/UL (ref 135–420)
PMV BLD AUTO: 9.7 FL (ref 9.2–11.8)
RBC # BLD AUTO: 3.21 M/UL (ref 4.2–5.3)
WBC # BLD AUTO: 14.6 K/UL (ref 4.6–13.2)

## 2018-03-14 PROCEDURE — 74011250637 HC RX REV CODE- 250/637: Performed by: UROLOGY

## 2018-03-14 PROCEDURE — 99218 HC RM OBSERVATION: CPT

## 2018-03-14 PROCEDURE — 74011250636 HC RX REV CODE- 250/636: Performed by: UROLOGY

## 2018-03-14 PROCEDURE — 77030020263 HC SOL INJ SOD CL0.9% LFCR 1000ML

## 2018-03-14 PROCEDURE — 96361 HYDRATE IV INFUSION ADD-ON: CPT

## 2018-03-14 PROCEDURE — 82962 GLUCOSE BLOOD TEST: CPT

## 2018-03-14 PROCEDURE — 96366 THER/PROPH/DIAG IV INF ADDON: CPT

## 2018-03-14 PROCEDURE — 85025 COMPLETE CBC W/AUTO DIFF WBC: CPT | Performed by: UROLOGY

## 2018-03-14 PROCEDURE — 36415 COLL VENOUS BLD VENIPUNCTURE: CPT | Performed by: UROLOGY

## 2018-03-14 RX ORDER — HYDROMORPHONE HYDROCHLORIDE 2 MG/1
2 TABLET ORAL
Qty: 25 TAB | Refills: 0 | Status: SHIPPED | OUTPATIENT
Start: 2018-03-14 | End: 2018-03-23

## 2018-03-14 RX ORDER — OXYBUTYNIN CHLORIDE 5 MG/1
5 TABLET ORAL 3 TIMES DAILY
Status: DISCONTINUED | OUTPATIENT
Start: 2018-03-14 | End: 2018-03-14 | Stop reason: HOSPADM

## 2018-03-14 RX ORDER — CIPROFLOXACIN 500 MG/1
500 TABLET ORAL 2 TIMES DAILY
Qty: 8 TAB | Refills: 0 | Status: SHIPPED | OUTPATIENT
Start: 2018-03-14 | End: 2018-03-18

## 2018-03-14 RX ORDER — DOCUSATE SODIUM 100 MG/1
100 CAPSULE, LIQUID FILLED ORAL 2 TIMES DAILY
Qty: 60 CAP | Refills: 0 | Status: SHIPPED | OUTPATIENT
Start: 2018-03-14 | End: 2018-03-23

## 2018-03-14 RX ADMIN — CALCIUM CARBONATE 500 MG (1,250 MG)-VITAMIN D3 200 UNIT TABLET 1 TABLET: at 09:24

## 2018-03-14 RX ADMIN — Medication 400 MG: at 09:24

## 2018-03-14 RX ADMIN — CIPROFLOXACIN 400 MG: 2 INJECTION, SOLUTION INTRAVENOUS at 09:25

## 2018-03-14 RX ADMIN — FLUTICASONE PROPIONATE 2 SPRAY: 50 SPRAY, METERED NASAL at 09:24

## 2018-03-14 RX ADMIN — HYDROMORPHONE HYDROCHLORIDE 2 MG: 2 TABLET ORAL at 04:56

## 2018-03-14 RX ADMIN — CIPROFLOXACIN 400 MG: 2 INJECTION, SOLUTION INTRAVENOUS at 00:26

## 2018-03-14 RX ADMIN — SODIUM CHLORIDE 75 ML/HR: 900 INJECTION, SOLUTION INTRAVENOUS at 03:59

## 2018-03-14 RX ADMIN — OXYBUTYNIN CHLORIDE 5 MG: 5 TABLET ORAL at 12:43

## 2018-03-14 RX ADMIN — PAROXETINE HYDROCHLORIDE 40 MG: 20 TABLET, FILM COATED ORAL at 09:24

## 2018-03-14 RX ADMIN — ZIPRASIDONE HYDROCHLORIDE 60 MG: 20 CAPSULE ORAL at 09:24

## 2018-03-14 RX ADMIN — LEVOTHYROXINE, LIOTHYRONINE 60 MG: 19; 4.5 TABLET ORAL at 09:25

## 2018-03-14 RX ADMIN — Medication 10 ML: at 06:27

## 2018-03-14 RX ADMIN — POTASSIUM CHLORIDE 10 MEQ: 750 TABLET, EXTENDED RELEASE ORAL at 09:24

## 2018-03-14 RX ADMIN — HYDROMORPHONE HYDROCHLORIDE 2 MG: 2 TABLET ORAL at 09:35

## 2018-03-14 RX ADMIN — HYDROCHLOROTHIAZIDE 12.5 MG: 12.5 CAPSULE ORAL at 09:24

## 2018-03-14 RX ADMIN — MONTELUKAST SODIUM 10 MG: 10 TABLET, FILM COATED ORAL at 09:24

## 2018-03-14 RX ADMIN — DOCUSATE SODIUM 100 MG: 100 CAPSULE, LIQUID FILLED ORAL at 09:24

## 2018-03-14 RX ADMIN — SPIRONOLACTONE 25 MG: 25 TABLET, FILM COATED ORAL at 09:24

## 2018-03-14 NOTE — PHYSICIAN ADVISORY
Letter of Admission Status Determination: Upgrade to Inpatient     Julian Bragg was originally hospitalized as observation status on 3/12/2018 for elective Right percutaneous nephrostolithotomy. Ongoing hospitalization was warranted for this patient who on day 2 had significant pain requiring multiple doses of IV analgesics, and concern for infection with marked leukocytosis requiring IV antibiotics and close clinical monitoring. It is our recommendation that this patient's hospitalization status be upgraded to INPATIENT status.      The final decision regarding the patient's hospitalization status depends on the attending physician's judgement.       Blaze Zabala MD, MELYSSA, [facility]  Physician [place].  [phone]    March 14, 2018   8:05 AM

## 2018-03-14 NOTE — PROGRESS NOTES
MD at bedside, nephrostomy tube removed, tolerated. Nursing to monitor site. @ 1230 PVR noted < 10ml volume, per Dr. Todd bowie to administer ditropan po if volume < 300 ml. Medication administered.

## 2018-03-14 NOTE — DISCHARGE SUMMARY
Discharge Summary    Patient: Imelda Corley               Sex: female          DOA: 3/12/2018         YOB: 1970      Age:  52 y.o.        LOS: 2 days     Admit Date: 3/12/2018    Discharge Date: 3/14/2018    Admission Diagnoses: kindey stone n20. 0;Staghorn renal calculus    Discharge Diagnoses:    Problem List as of 3/14/2018  Date Reviewed: 3/12/2018          Codes Class Noted - Resolved    Staghorn renal calculus ICD-10-CM: N20.0  ICD-9-CM: 592.0  3/12/2018 - Present        Nephrolithiasis ICD-10-CM: N20.0  ICD-9-CM: 592.0  8/9/2016 - Present              Discharge Condition: Good    Hospital Course: Patient admitted after Right PCNL on 3/12/18. Course was uncomplicated with patient's pain controlled and tolerating diet by POD 2. Also ambulating with no issues. PCN and salgado removed with patient voiding with no residuals. Will see Dr. Víctor Carrillo in 1 week for follow up. Consults: None    Significant Diagnostic Studies: None    Discharge Medications:     Current Discharge Medication List      START taking these medications    Details   HYDROmorphone (DILAUDID) 2 mg tablet Take 1 Tab by mouth every four (4) hours as needed for Pain. Max Daily Amount: 12 mg.  Qty: 25 Tab, Refills: 0      docusate sodium (COLACE) 100 mg capsule Take 1 Cap by mouth two (2) times a day for 30 days. Qty: 60 Cap, Refills: 0         CONTINUE these medications which have CHANGED    Details   ciprofloxacin HCl (CIPRO) 500 mg tablet Take 1 Tab by mouth two (2) times a day for 4 days. Indications: PROTEUS URINARY TRACT INFECTION  Qty: 8 Tab, Refills: 0         CONTINUE these medications which have NOT CHANGED    Details   estradiol 0.025 mg/24 hr ptsw by TransDERmal route. Lisdexamfetamine (VYVANSE) 70 mg capsule Take 70 mg by mouth every morning. fluticasone (FLONASE) 50 mcg/actuation nasal spray 2 Sprays by Both Nostrils route daily.       ziprasidone hcl (GEODON) 60 mg capsule Take 60 mg by mouth two (2) times daily (with meals). thyroid, Pork, (ARMOUR THYROID) 60 mg tablet Take 60 mg by mouth daily. montelukast (SINGULAIR) 10 mg tablet Take 10 mg by mouth daily. hydrochlorothiazide (MICROZIDE) 12.5 mg capsule Take 12.5 mg by mouth daily. spironolactone (ALDACTONE) 25 mg tablet Take  by mouth daily. PARoxetine (PAXIL) 40 mg tablet Take 40 mg by mouth daily. potassium chloride (K-DUR, KLOR-CON) 20 mEq tablet Take  by mouth two (2) times a day. magnesium oxide (MAG-OX) 400 mg tablet Take 400 mg by mouth daily. calcium-cholecalciferol, d3, 600-125 mg-unit tab Take  by mouth. traZODone (DESYREL) 100 mg tablet Take 100 mg by mouth nightly. albuterol (PROVENTIL HFA, VENTOLIN HFA, PROAIR HFA) 90 mcg/actuation inhaler Take  by inhalation.              Activity: Activity as tolerated    Diet: Regular Diet    Wound Care: Reinforce dressing PRN    Follow-up: In one week with Dr. Braulio Hidalgo

## 2018-03-14 NOTE — DISCHARGE INSTRUCTIONS
Kidney Stone: Care Instructions  Your Care Instructions    Kidney stones are formed when salts, minerals, and other substances normally found in the urine clump together. They can be as small as grains of sand or, rarely, as large as golf balls. While the stone is traveling through the ureter, which is the tube that carries urine from the kidney to the bladder, you will probably feel pain. The pain may be mild or very severe. You may also have some blood in your urine. As soon as the stone reaches the bladder, any intense pain should go away. If a stone is too large to pass on its own, you may need a medical procedure to help you pass the stone. The doctor has checked you carefully, but problems can develop later. If you notice any problems or new symptoms, get medical treatment right away. Follow-up care is a key part of your treatment and safety. Be sure to make and go to all appointments, and call your doctor if you are having problems. It's also a good idea to know your test results and keep a list of the medicines you take. How can you care for yourself at home? · Drink plenty of fluids, enough so that your urine is light yellow or clear like water. If you have kidney, heart, or liver disease and have to limit fluids, talk with your doctor before you increase the amount of fluids you drink. · Take pain medicines exactly as directed. Call your doctor if you think you are having a problem with your medicine. ¨ If the doctor gave you a prescription medicine for pain, take it as prescribed. ¨ If you are not taking a prescription pain medicine, ask your doctor if you can take an over-the-counter medicine. Read and follow all instructions on the label. · Your doctor may ask you to strain your urine so that you can collect your kidney stone when it passes. You can use a kitchen strainer or a tea strainer to catch the stone. Store it in a plastic bag until you see your doctor again.   Preventing future kidney stones  Some changes in your diet may help prevent kidney stones. Depending on the cause of your stones, your doctor may recommend that you:  · Drink plenty of fluids, enough so that your urine is light yellow or clear like water. If you have kidney, heart, or liver disease and have to limit fluids, talk with your doctor before you increase the amount of fluids you drink. · Limit coffee, tea, and alcohol. Also avoid grapefruit juice. · Do not take more than the recommended daily dose of vitamins C and D.  · Avoid antacids such as Gaviscon, Maalox, Mylanta, or Tums. · Limit the amount of salt (sodium) in your diet. · Eat a balanced diet that is not too high in protein. · Limit foods that are high in a substance called oxalate, which can cause kidney stones. These foods include dark green vegetables, rhubarb, chocolate, wheat bran, nuts, cranberries, and beans. When should you call for help? Call your doctor now or seek immediate medical care if:  ? · You cannot keep down fluids. ? · Your pain gets worse. ? · You have a fever or chills. ? · You have new or worse pain in your back just below your rib cage (the flank area). ? · You have new or more blood in your urine. ? Watch closely for changes in your health, and be sure to contact your doctor if:  ? · You do not get better as expected. Where can you learn more? Go to http://blessing-luiz.info/. Enter H779 in the search box to learn more about \"Kidney Stone: Care Instructions. \"  Current as of: May 12, 2017  Content Version: 11.4  © 6754-0050 [employer]. Care instructions adapted under license by [employer] (which disclaims liability or warranty for this information). If you have questions about a medical condition or this instruction, always ask your healthcare professional. [address] any warranty or liability for your use of this information.     DISCHARGE SUMMARY from Nurse    PATIENT INSTRUCTIONS:    After general anesthesia or intravenous sedation, for 24 hours or while taking prescription Narcotics:  · Limit your activities  · Do not drive and operate hazardous machinery  · Do not make important personal or business decisions  · Do  not drink alcoholic beverages  · If you have not urinated within 8 hours after discharge, please contact your surgeon on call. Report the following to your surgeon:  · Excessive pain, swelling, redness or odor of or around the surgical area  · Temperature over 100.5  · Nausea and vomiting lasting longer than 4 hours or if unable to take medications  · Any signs of decreased circulation or nerve impairment to extremity: change in color, persistent  numbness, tingling, coldness or increase pain  · Any questions    What to do at Home:  Recommended activity: Activity as tolerated,     *  Please give a list of your current medications to your Primary Care Provider. *  Please update this list whenever your medications are discontinued, doses are      changed, or new medications (including over-the-counter products) are added. *  Please carry medication information at all times in case of emergency situations. These are general instructions for a healthy lifestyle:    No smoking/ No tobacco products/ Avoid exposure to second hand smoke  Surgeon General's Warning:  Quitting smoking now greatly reduces serious risk to your health. Obesity, smoking, and sedentary lifestyle greatly increases your risk for illness    A healthy diet, regular physical exercise & weight monitoring are important for maintaining a healthy lifestyle    You may be retaining fluid if you have a history of heart failure or if you experience any of the following symptoms:  Weight gain of 3 pounds or more overnight or 5 pounds in a week, increased swelling in our hands or feet or shortness of breath while lying flat in bed.   Please call your doctor as soon as you notice any of these symptoms; do not wait until your next office visit. Recognize signs and symptoms of STROKE:    F-face looks uneven    A-arms unable to move or move unevenly    S-speech slurred or non-existent    T-time-call 911 as soon as signs and symptoms begin-DO NOT go       Back to bed or wait to see if you get better-TIME IS BRAIN. Warning Signs of HEART ATTACK     Call 911 if you have these symptoms:   Chest discomfort. Most heart attacks involve discomfort in the center of the chest that lasts more than a few minutes, or that goes away and comes back. It can feel like uncomfortable pressure, squeezing, fullness, or pain.  Discomfort in other areas of the upper body. Symptoms can include pain or discomfort in one or both arms, the back, neck, jaw, or stomach.  Shortness of breath with or without chest discomfort.  Other signs may include breaking out in a cold sweat, nausea, or lightheadedness. Don't wait more than five minutes to call 911 - MINUTES MATTER! Fast action can save your life. Calling 911 is almost always the fastest way to get lifesaving treatment. Emergency Medical Services staff can begin treatment when they arrive -- up to an hour sooner than if someone gets to the hospital by car. The discharge information has been reviewed with the patient. The patient verbalized understanding. Discharge medications reviewed with the patient and appropriate educational materials and side effects teaching were provided.   ___________________________________________________________________________________________________________________________________

## 2018-03-14 NOTE — PROGRESS NOTES
0122 Pt remain alert and oriented voiced no respiratory distress nor discomfort as of now. Pt currently due to void d/t status post salgado removal, will continue taking to the bathroom and intervene as needed. 0326 Pt OOB and assisted to the bathroom and voided, denied discomfort upon urination. Assisted back to bed and resting call bell within reach no acute distress.

## 2018-03-14 NOTE — ROUTINE PROCESS
I have reviewed discharge instructions with the patient. The patient verbalized understanding. Waiting on [facility] to deliver drugs.    Pankaj is currently at bedside

## 2018-03-14 NOTE — ROUTINE PROCESS
Bedside and Verbal shift change report given to Cynthia Aranda RN. Report included the following information SBAR, Kardex, Intake/Output and MAR.

## 2018-03-14 NOTE — PROGRESS NOTES
Urology Progress Note        Assessment/Plan:     Patient Active Problem List   Diagnosis Code    Nephrolithiasis N20.0    Staghorn renal calculus N20.0         Plan:  Discharge today if pain controlled after nephrostomy removal.    Jenn Askew MD    [phone]      Subjective:     Daily Progress Note: 3/14/2018 8:29 AM    Alissa Roberto is doing good. She reports pain is well controlled. She has no new complaints. She is tolerating a solid diet and ambulating without assistance. Patient is voiding without difficulty. Objective:     Visit Vitals    /71 (BP 1 Location: Right arm, BP Patient Position: At rest)    Pulse 82    Temp 98.2 °F (36.8 °C)    Resp 17    Ht 5' 5\" (1.651 m)    Wt 183 lb (83 kg)    SpO2 92%    BMI 30.45 kg/m2        Temp (24hrs), Av.1 °F (36.7 °C), Min:97.9 °F (36.6 °C), Max:98.2 °F (36.8 °C)      Intake and Output:   1901 -  0700  In: 6180.8 [P.O.:1540; I.V.:4640.8]  Out: 3840 [Urine:3840]       PHYSICAL EXAMINATION:   Visit Vitals    /71 (BP 1 Location: Right arm, BP Patient Position: At rest)    Pulse 82    Temp 98.2 °F (36.8 °C)    Resp 17    Ht 5' 5\" (1.651 m)    Wt 183 lb (83 kg)    SpO2 92%    BMI 30.45 kg/m2     Constitutional: Well developed, well-nourished female in no acute distress. HEENT: Normocephalic, Atraumatic   CV:   RRR   Respiratory: No respiratory distress or difficulties breathing   Abdomen:  Soft and nontender. No masses. No hepatosplenomegaly.  Female:  CVA tenderness: minimal on right                         Skin:  Normal color. No evidence of jaundice. Neuro/Psych:  Patient with appropriate affect. Alert and oriented. Incision: small right flank incision clean and dry.     Lab/Data Review:      Labs:     Labs: Results:   Chemistry    Recent Labs      18   0455  18   1615  18   1037   GLU  108*  126*  103*   NA  139  140  139   K  4.5  3.6  3.6   CL  107  107  105   CO2 24  26  27   BUN  12  14  13   CREA  0.73  0.81  0.84   CA  7.1*  7.7*  9.1   AGAP  8  7  7   BUCR  16  17  15      CBC w/Diff Recent Labs      03/14/18   0430  03/13/18   0455  03/12/18   1615  03/12/18   1037   WBC  14.6*  24.2*   --   12.2   RBC  3.21*  3.72*   --   4.71   HGB  9.3*  10.6*   --   13.8   HCT  28.2*  32.2*  35.2  39.5   PLT  288  329   --   405   GRANS  70   --    --    --    LYMPH  23   --    --    --    EOS  2   --    --    --       Cultures Recent Labs      03/12/18   1000   CULT  NO GROWTH 1 DAY     All Micro Results     None            Urinalysis Color   Date Value Ref Range Status   03/12/2018 YELLOW   Final     Appearance   Date Value Ref Range Status   03/12/2018 CLOUDY   Final     Specific gravity   Date Value Ref Range Status   03/12/2018 1.018 1.005 - 1.030   Final     pH (UA)   Date Value Ref Range Status   03/12/2018 8.0 5.0 - 8.0   Final     Protein   Date Value Ref Range Status   03/12/2018 NEGATIVE  NEG mg/dL Final     Ketone   Date Value Ref Range Status   03/12/2018 NEGATIVE  NEG mg/dL Final     Bilirubin   Date Value Ref Range Status   03/12/2018 NEGATIVE  NEG   Final     Blood   Date Value Ref Range Status   03/12/2018 SMALL (A) NEG   Final     Urobilinogen   Date Value Ref Range Status   03/12/2018 0.2 0.2 - 1.0 EU/dL Final     Nitrites   Date Value Ref Range Status   03/12/2018 NEGATIVE  NEG   Final     Leukocyte Esterase   Date Value Ref Range Status   03/12/2018 MODERATE (A) NEG   Final     Potassium   Date Value Ref Range Status   03/13/2018 4.5 3.5 - 5.5 mmol/L Final     Creatinine   Date Value Ref Range Status   03/13/2018 0.73 0.6 - 1.3 MG/DL Final     BUN   Date Value Ref Range Status   03/13/2018 12 7.0 - 18 MG/DL Final      PSA No results for input(s): PSA in the last 72 hours.    Coagulation Lab Results   Component Value Date/Time    Prothrombin time 11.9 03/12/2018 10:37 AM    INR 0.9 03/12/2018 10:37 AM    aPTT 26.4 03/12/2018 10:37 AM

## 2018-03-14 NOTE — PROGRESS NOTES
Problem: Falls - Risk of  Goal: *Absence of Falls  Document Portia Fall Risk and appropriate interventions in the flowsheet.    Outcome: Progressing Towards Goal  Fall Risk Interventions:  Mobility Interventions: Patient to call before getting OOB    Mentation Interventions: Adequate sleep, hydration, pain control    Medication Interventions: Patient to call before getting OOB    Elimination Interventions: Call light in reach, Patient to call for help with toileting needs    History of Falls Interventions: Evaluate medications/consider consulting pharmacy

## 2018-03-22 LAB
CA PHOS MFR STONE: 45 %
CALCIUM OXALATE DIHYDRATE MFR STONE IR: 5 %
COLOR STONE: NORMAL
COM MFR STONE: 35 %
COMMENT, 519994: NORMAL
COMPOSITION, 120440: NORMAL
DISCLAIMER, STO32L: NORMAL
NIDUS STONE QL: NORMAL
SIZE STONE: NORMAL MM
STONE ANALYSIS-IMP: NORMAL
SURFACE CRYSTALS, 120439: NORMAL
TRI-PHOS MFR STONE: 15 %
WT STONE: 101.1 MG

## 2018-03-29 NOTE — ANCILLARY DISCHARGE INSTRUCTIONS
[facility] [address]  Discharge Phone Call       After-Care Discharge Phone Call Questions: no answer     Were you able to get your prescriptions filled? Comment:      [] Yes  []No    Comment if answer is \"No\"   Are you taking your medication(s) as your doctor ordered? Do you understand the purpose of your medications? Comment:    [] Yes  []No    Comment if answer is \"No\"   Are you taking any other medications that are not on the list?  Comment:      [] Yes  []No    Comment if answer is \"Yes\"   Do you have any questions about your medications? Are you aware of potential side effects? Comment:    [] Yes  []No    Comment if answer is \"Yes\"   Did you make your follow-up appointments (if the hospital did not do this before  discharge)? Comment:    [] Yes  []No    Comment if answer is \"No\"   Is there any reason you might not be able to keep your follow-up appointments? Comment:     [] Yes  []No    Comment if answer is \"Yes\"   Do you have any questions about your care plan? Are you aware of what health problems to be alert for? Comment:    [] Yes  []No    Comment if answer is \"Yes\"   Do you have a good understanding of how you should manage your health? Comment:    [] Yes  []No    Comment if answer is \"Yes\"   Do you know which symptoms to watch for that would mean you would need to call your doctor right away? Comment:      [] Yes  []No    Comment if answer is \"No\"   Do you have any questions about the follow up process or any instructions that we have provided? Comment:    [] Yes  []No    Comment if answer is \"Yes\"   Did staff take your preferences into account?         [] Yes  []No    Comment if answer is \"Yes\"

## 2018-12-21 PROBLEM — E83.51 HYPOCALCEMIA: Status: ACTIVE | Noted: 2018-12-21

## (undated) DEVICE — SOLUTION IV 1000ML 0.9% SOD CHL

## (undated) DEVICE — FABRIC REINFORCED, SURGICAL GOWN, XL: Brand: CONVERTORS

## (undated) DEVICE — DEPAUL MAJOR PROCEDURE PACK: Brand: MEDLINE INDUSTRIES, INC.

## (undated) DEVICE — SYRINGE MED 20ML STD CLR PLAS LUERLOCK TIP N CTRL DISP

## (undated) DEVICE — INTENDED FOR TISSUE SEPARATION, AND OTHER PROCEDURES THAT REQUIRE A SHARP SURGICAL BLADE TO PUNCTURE OR CUT.: Brand: BARD-PARKER ® CARBON RIB-BACK BLADES

## (undated) DEVICE — SEAL INSTR CYSTO ADJ BX PRT SEAL FOR ACC UP TO 6FR

## (undated) DEVICE — RADIFOCUS GLIDEWIRE: Brand: GLIDEWIRE

## (undated) DEVICE — INFLATION DEVICE: Brand: ENCORE™ 26

## (undated) DEVICE — BAG DRAIN URIN 2000ML LF STRL -- CONVERT TO ITEM 363123

## (undated) DEVICE — GDWIRE 3CM FLX-TIP 0.038X150CM -- BX/5 SENSOR

## (undated) DEVICE — SOL IRR SOD CL 0.9% 3000ML --

## (undated) DEVICE — KENDALL SCD EXPRESS SLEEVES, KNEE LENGTH, MEDIUM: Brand: KENDALL SCD

## (undated) DEVICE — SNAP KOVER: Brand: UNBRANDED

## (undated) DEVICE — (D)SYR 10ML 1/5ML GRAD NSAF -- PKGING CHANGE USE ITEM 338027

## (undated) DEVICE — CATHETER URETH 16FR BLLN 5CC STD LTX 2 W F TWO OPP DRNGE

## (undated) DEVICE — OPEN-END FLEXI-TIP URETERAL CATHETER: Brand: FLEXI-TIP